# Patient Record
Sex: FEMALE | Race: WHITE | NOT HISPANIC OR LATINO | Employment: STUDENT
[De-identification: names, ages, dates, MRNs, and addresses within clinical notes are randomized per-mention and may not be internally consistent; named-entity substitution may affect disease eponyms.]

---

## 2017-03-24 DIAGNOSIS — F41.1 GENERALIZED ANXIETY DISORDER: ICD-10-CM

## 2017-03-24 RX ORDER — BUSPIRONE HYDROCHLORIDE 10 MG/1
TABLET ORAL
Qty: 60 TABLET | Refills: 0 | Status: SHIPPED | OUTPATIENT
Start: 2017-03-24 | End: 2017-05-04

## 2017-03-24 NOTE — TELEPHONE ENCOUNTER
busPIRone (BUSPAR) 10 MG tablet       Last Written Prescription Date: 6/13/2016  Last Fill Quantity: 180; # refills: 2  Last Office Visit with [facility], [facility] or [facility] prescribing provider:  3/15/2016        Last PHQ-9 score on record=   PHQ-9 SCORE 8/19/2015   Total Score -   Total Score 2       No results found for: AST  No results found for: ALT

## 2017-06-10 ENCOUNTER — HEALTH MAINTENANCE LETTER (OUTPATIENT)
Age: 22
End: 2017-06-10

## 2017-06-27 ENCOUNTER — TELEPHONE (OUTPATIENT)
Dept: INTERNAL MEDICINE | Facility: CLINIC | Age: 22
End: 2017-06-27

## 2017-06-27 NOTE — TELEPHONE ENCOUNTER
Patient has an appointment on 9/1 but would like to get in sooner if possible. Depression is really bad right now and needs to know who to talk to and medication information. She is taking her meds but the depression is worse. I asked if she would like to see another doctor and she said no, she wants to see Jason. If we can't get her in, she may be willing to try someone else but she would like us to try to get her in.  [phone],ok to lv detailed message, anytime.  [facility]

## 2017-09-03 ENCOUNTER — MYC MEDICAL ADVICE (OUTPATIENT)
Dept: INTERNAL MEDICINE | Facility: CLINIC | Age: 22
End: 2017-09-03

## 2019-01-31 ENCOUNTER — OFFICE VISIT (OUTPATIENT)
Dept: INTERNAL MEDICINE | Facility: CLINIC | Age: 24
End: 2019-01-31
Payer: COMMERCIAL

## 2019-01-31 VITALS
DIASTOLIC BLOOD PRESSURE: 62 MMHG | WEIGHT: 186 LBS | HEIGHT: 70 IN | OXYGEN SATURATION: 98 % | TEMPERATURE: 97.5 F | SYSTOLIC BLOOD PRESSURE: 106 MMHG | HEART RATE: 68 BPM | BODY MASS INDEX: 26.63 KG/M2 | RESPIRATION RATE: 16 BRPM

## 2019-01-31 DIAGNOSIS — F41.1 GENERALIZED ANXIETY DISORDER: ICD-10-CM

## 2019-01-31 DIAGNOSIS — F33.41 RECURRENT MAJOR DEPRESSIVE DISORDER, IN PARTIAL REMISSION (H): Primary | ICD-10-CM

## 2019-01-31 PROCEDURE — 99213 OFFICE O/P EST LOW 20 MIN: CPT | Performed by: PHYSICIAN ASSISTANT

## 2019-01-31 RX ORDER — BUSPIRONE HYDROCHLORIDE 10 MG/1
10 TABLET ORAL DAILY
Qty: 90 TABLET | Refills: 3 | Status: SHIPPED | OUTPATIENT
Start: 2019-01-31 | End: 2020-04-24

## 2019-01-31 RX ORDER — NORGESTIMATE AND ETHINYL ESTRADIOL 0.25-0.035
KIT ORAL
Refills: 0 | COMMUNITY
Start: 2018-12-19 | End: 2019-03-14

## 2019-01-31 RX ORDER — SERTRALINE HYDROCHLORIDE 100 MG/1
1 TABLET, FILM COATED ORAL EVERY 24 HOURS
COMMUNITY
Start: 2008-11-04 | End: 2019-01-31

## 2019-01-31 RX ORDER — SERTRALINE HYDROCHLORIDE 100 MG/1
100 TABLET, FILM COATED ORAL EVERY 24 HOURS
Qty: 90 TABLET | Refills: 3 | Status: SHIPPED | OUTPATIENT
Start: 2019-01-31 | End: 2020-03-17

## 2019-01-31 ASSESSMENT — ANXIETY QUESTIONNAIRES
7. FEELING AFRAID AS IF SOMETHING AWFUL MIGHT HAPPEN: NOT AT ALL
6. BECOMING EASILY ANNOYED OR IRRITABLE: SEVERAL DAYS
1. FEELING NERVOUS, ANXIOUS, OR ON EDGE: NOT AT ALL
GAD7 TOTAL SCORE: 1
5. BEING SO RESTLESS THAT IT IS HARD TO SIT STILL: NOT AT ALL
2. NOT BEING ABLE TO STOP OR CONTROL WORRYING: NOT AT ALL
3. WORRYING TOO MUCH ABOUT DIFFERENT THINGS: NOT AT ALL
IF YOU CHECKED OFF ANY PROBLEMS ON THIS QUESTIONNAIRE, HOW DIFFICULT HAVE THESE PROBLEMS MADE IT FOR YOU TO DO YOUR WORK, TAKE CARE OF THINGS AT HOME, OR GET ALONG WITH OTHER PEOPLE: NOT DIFFICULT AT ALL

## 2019-01-31 ASSESSMENT — MIFFLIN-ST. JEOR: SCORE: 1676

## 2019-01-31 ASSESSMENT — PATIENT HEALTH QUESTIONNAIRE - PHQ9
SUM OF ALL RESPONSES TO PHQ QUESTIONS 1-9: 6
5. POOR APPETITE OR OVEREATING: NOT AT ALL

## 2019-01-31 NOTE — PROGRESS NOTES
"  SUBJECTIVE:   Crsitel Irving is a 23 year old female who presents to clinic today for the following health issues:    New Patient/Transfer of Care. Est care       Medication Followup of Zoloft and Buspar     Taking Medication as prescribed: yes    Side Effects:  None    Medication Helping Symptoms:  Yes    Patient reports anxiety is well controlled    Pt notes some increase in depression, but notes this is common for winter     Pt has no side effects or concerns      Problem list and histories reviewed & adjusted, as indicated.  Additional history: as documented      Reviewed and updated as needed this visit by clinical staff  Tobacco  Allergies  Meds  Med Hx  Surg Hx  Fam Hx  Soc Hx      Reviewed and updated as needed this visit by Provider  Tobacco  Surg Hx  Soc Hx          OBJECTIVE:     /62   Pulse 68   Temp 97.5  F (36.4  C) (Oral)   Resp 16   Ht 1.765 m (5' 9.5\")   Wt 84.4 kg (186 lb)   LMP 01/27/2019 (Exact Date)   SpO2 98%   Breastfeeding? No   BMI 27.07 kg/m    Body mass index is 27.07 kg/m .  GENERAL: healthy, alert and no distress  PSYCH: mentation appears normal, affect normal/bright, affect flat and judgement and insight intact    Diagnostic Test Results:  none     ASSESSMENT/PLAN:     1. Generalized anxiety disorder  2. Recurrent major depressive disorder, in partial remission (H)  - reviewed interaction with medications and potential for serotonin syndrome.  pt declines switch in medication  - busPIRone (BUSPAR) 10 MG tablet; Take 1 tablet (10 mg) by mouth daily  Dispense: 90 tablet; Refill: 3  - sertraline (ZOLOFT) 100 MG tablet; Take 1 tablet (100 mg) by mouth every 24 hours  Dispense: 90 tablet; Refill: 3  - annual follow up     Mayra Cuenca PA-C  [facility]  "

## 2019-02-01 ASSESSMENT — ANXIETY QUESTIONNAIRES: GAD7 TOTAL SCORE: 1

## 2019-03-11 ENCOUNTER — TELEPHONE (OUTPATIENT)
Dept: INTERNAL MEDICINE | Facility: CLINIC | Age: 24
End: 2019-03-11

## 2019-03-14 DIAGNOSIS — Z30.011 ENCOUNTER FOR INITIAL PRESCRIPTION OF CONTRACEPTIVE PILLS: Primary | ICD-10-CM

## 2019-03-14 RX ORDER — NORGESTIMATE AND ETHINYL ESTRADIOL 0.25-0.035
KIT ORAL
Qty: 84 TABLET | Refills: 0 | Status: SHIPPED | OUTPATIENT
Start: 2019-03-14 | End: 2020-05-11

## 2019-03-14 NOTE — TELEPHONE ENCOUNTER
Routing refill request to provider for review/approval because:  Medication is reported/historical    Ese WOLFE RN, BSN, PHN

## 2019-03-14 NOTE — TELEPHONE ENCOUNTER
"Requested Prescriptions   Pending Prescriptions Disp Refills     ESTARYLLA 0.25-35 MG-MCG tablet  0    Contraceptives Protocol Passed - 3/14/2019  4:23 PM       Passed - Patient is not a current smoker if age is 35 or older       Passed - Recent (12 mo) or future (30 days) visit within the authorizing provider's specialty    Patient had office visit in the last 12 months or has a visit in the next 30 days with authorizing provider or within the authorizing provider's specialty.  See \"Patient Info\" tab in inbasket, or \"Choose Columns\" in Meds & Orders section of the refill encounter.             Passed - Medication is active on med list       Passed - No active pregnancy on record       Passed - No positive pregnancy test in past 12 months        Last Written Prescription Date:  12/19/18  Last Fill Quantity: ,  # refills: 0   Last office visit: 1/31/2019 with prescribing provider:  1/31/19   Future Office Visit:      "

## 2019-06-05 ENCOUNTER — OFFICE VISIT (OUTPATIENT)
Dept: INTERNAL MEDICINE | Facility: CLINIC | Age: 24
End: 2019-06-05
Payer: COMMERCIAL

## 2019-06-05 VITALS
DIASTOLIC BLOOD PRESSURE: 74 MMHG | HEART RATE: 77 BPM | WEIGHT: 183.4 LBS | TEMPERATURE: 98.7 F | SYSTOLIC BLOOD PRESSURE: 102 MMHG | BODY MASS INDEX: 26.7 KG/M2 | OXYGEN SATURATION: 98 % | RESPIRATION RATE: 16 BRPM

## 2019-06-05 DIAGNOSIS — Z30.41 ENCOUNTER FOR SURVEILLANCE OF CONTRACEPTIVE PILLS: ICD-10-CM

## 2019-06-05 PROCEDURE — 99213 OFFICE O/P EST LOW 20 MIN: CPT | Performed by: PHYSICIAN ASSISTANT

## 2019-06-05 RX ORDER — NORGESTIMATE AND ETHINYL ESTRADIOL 7DAYSX3 LO
1 KIT ORAL DAILY
Qty: 84 TABLET | Refills: 3 | Status: SHIPPED | OUTPATIENT
Start: 2019-06-05 | End: 2020-05-11

## 2019-06-09 ENCOUNTER — MYC MEDICAL ADVICE (OUTPATIENT)
Dept: INTERNAL MEDICINE | Facility: CLINIC | Age: 24
End: 2019-06-09

## 2019-06-09 DIAGNOSIS — Z30.011 ENCOUNTER FOR INITIAL PRESCRIPTION OF CONTRACEPTIVE PILLS: ICD-10-CM

## 2019-06-10 RX ORDER — NORGESTIMATE AND ETHINYL ESTRADIOL 0.25-0.035
1 KIT ORAL DAILY
Qty: 84 TABLET | Refills: 3 | Status: SHIPPED | OUTPATIENT
Start: 2019-06-10 | End: 2020-05-11

## 2019-06-10 RX ORDER — NORGESTIMATE AND ETHINYL ESTRADIOL 7DAYSX3 28
1 KIT ORAL DAILY
Qty: 84 TABLET | Refills: 3 | Status: SHIPPED | OUTPATIENT
Start: 2019-06-10 | End: 2020-05-11

## 2019-06-10 RX ORDER — NORGESTIMATE AND ETHINYL ESTRADIOL 0.25-0.035
1 KIT ORAL DAILY
Status: CANCELLED | OUTPATIENT
Start: 2019-06-10

## 2020-03-16 DIAGNOSIS — F41.1 GENERALIZED ANXIETY DISORDER: ICD-10-CM

## 2020-03-17 RX ORDER — SERTRALINE HYDROCHLORIDE 100 MG/1
100 TABLET, FILM COATED ORAL EVERY 24 HOURS
Qty: 90 TABLET | Refills: 1 | Status: SHIPPED | OUTPATIENT
Start: 2020-03-17 | End: 2020-05-11

## 2020-04-24 ENCOUNTER — MYC REFILL (OUTPATIENT)
Dept: INTERNAL MEDICINE | Facility: CLINIC | Age: 25
End: 2020-04-24

## 2020-04-24 DIAGNOSIS — F41.1 GENERALIZED ANXIETY DISORDER: ICD-10-CM

## 2020-04-24 RX ORDER — BUSPIRONE HYDROCHLORIDE 10 MG/1
10 TABLET ORAL DAILY
Qty: 30 TABLET | Refills: 0 | Status: SHIPPED | OUTPATIENT
Start: 2020-04-24 | End: 2020-05-11

## 2020-04-24 NOTE — TELEPHONE ENCOUNTER
Prescription approved per [facility] Refill Protocol.  Due for follow-up appointment.

## 2020-05-11 ENCOUNTER — VIRTUAL VISIT (OUTPATIENT)
Dept: INTERNAL MEDICINE | Facility: CLINIC | Age: 25
End: 2020-05-11
Payer: COMMERCIAL

## 2020-05-11 DIAGNOSIS — Z30.41 ENCOUNTER FOR BIRTH CONTROL PILLS MAINTENANCE: ICD-10-CM

## 2020-05-11 DIAGNOSIS — F33.42 RECURRENT MAJOR DEPRESSIVE DISORDER, IN FULL REMISSION (H): ICD-10-CM

## 2020-05-11 DIAGNOSIS — F41.1 GENERALIZED ANXIETY DISORDER: Primary | ICD-10-CM

## 2020-05-11 PROCEDURE — 99213 OFFICE O/P EST LOW 20 MIN: CPT | Mod: 95 | Performed by: INTERNAL MEDICINE

## 2020-05-11 RX ORDER — SERTRALINE HYDROCHLORIDE 100 MG/1
100 TABLET, FILM COATED ORAL EVERY 24 HOURS
Qty: 90 TABLET | Refills: 3 | Status: SHIPPED | OUTPATIENT
Start: 2020-05-11 | End: 2021-05-21

## 2020-05-11 RX ORDER — NORGESTIMATE AND ETHINYL ESTRADIOL 0.25-0.035
1 KIT ORAL DAILY
Qty: 84 TABLET | Refills: 3 | Status: SHIPPED | OUTPATIENT
Start: 2020-05-11 | End: 2020-08-20

## 2020-05-11 RX ORDER — BUSPIRONE HYDROCHLORIDE 10 MG/1
10 TABLET ORAL DAILY
Qty: 90 TABLET | Refills: 3 | Status: SHIPPED | OUTPATIENT
Start: 2020-05-11 | End: 2020-08-20

## 2020-05-11 ASSESSMENT — ANXIETY QUESTIONNAIRES
6. BECOMING EASILY ANNOYED OR IRRITABLE: NOT AT ALL
IF YOU CHECKED OFF ANY PROBLEMS ON THIS QUESTIONNAIRE, HOW DIFFICULT HAVE THESE PROBLEMS MADE IT FOR YOU TO DO YOUR WORK, TAKE CARE OF THINGS AT HOME, OR GET ALONG WITH OTHER PEOPLE: NOT DIFFICULT AT ALL
7. FEELING AFRAID AS IF SOMETHING AWFUL MIGHT HAPPEN: NOT AT ALL
2. NOT BEING ABLE TO STOP OR CONTROL WORRYING: NOT AT ALL
1. FEELING NERVOUS, ANXIOUS, OR ON EDGE: NOT AT ALL
GAD7 TOTAL SCORE: 0
5. BEING SO RESTLESS THAT IT IS HARD TO SIT STILL: NOT AT ALL
3. WORRYING TOO MUCH ABOUT DIFFERENT THINGS: NOT AT ALL

## 2020-05-11 ASSESSMENT — PATIENT HEALTH QUESTIONNAIRE - PHQ9
5. POOR APPETITE OR OVEREATING: NOT AT ALL
SUM OF ALL RESPONSES TO PHQ QUESTIONS 1-9: 0

## 2020-05-11 NOTE — PROGRESS NOTES
"Cristel Irving is a 25 year old female who is being evaluated via a billable telephone visit.      The patient has been notified of following:     \"This telephone visit will be conducted via a call between you and your physician/provider. We have found that certain health care needs can be provided without the need for a physical exam.  This service lets us provide the care you need with a short phone conversation.  If a prescription is necessary we can send it directly to your pharmacy.  If lab work is needed we can place an order for that and you can then stop by our lab to have the test done at a later time.    Telephone visits are billed at different rates depending on your insurance coverage. During this emergency period, for some insurers they may be billed the same as an in-person visit.  Please reach out to your insurance provider with any questions.    If during the course of the call the physician/provider feels a telephone visit is not appropriate, you will not be charged for this service.\"    Patient has given verbal consent for Telephone visit?  Yes    What phone number would you like to be contacted at? [phone]    How would you like to obtain your AVS? MyChart    TELEPHONE VISIT                                                      SUBJECTIVE:                                                      HPI: Cristel Irving is a pleasant 25 year old female who requested a telephone visit to discuss her current medications:    Patient is currently on Zoloft 100 mg daily and BuSpar 10 mg daily for depression and anxiety. Tolerating well - no adverse side effects. Patient reports that her depression and anxiety are very well controlled on current medications. No adjustments desired. Refills needed.    Patient is also on Ortho-Cyclen for birth control. Tolerating well - no adverse side effects. She continues to not smoke. She understands that birth control pills do not protect against STDs. Refills " needed.    ASSESSMENT/PLAN:                                                      (F41.1) Generalized anxiety disorder  (primary encounter diagnosis)  (F33.42) Recurrent major depressive disorder, in full remission (H)  Comment: well-controlled on Zoloft and Buspar.   Plan: CPM; refills provided.    (Z30.41) Encounter for birth control pills maintenance  Plan: refills of OCP provided.     Total time of call between patient and provider was 5 minutes.     (Chart documentation was completed, in part, with Pro V[employer]V voice-recognition software. Even though reviewed, some grammatical, spelling, and word errors may remain.)    Rosa Goodman MD   [address] [address]  T: [phone], F: [phone]

## 2020-05-12 ASSESSMENT — ANXIETY QUESTIONNAIRES: GAD7 TOTAL SCORE: 0

## 2020-08-18 DIAGNOSIS — Z30.41 ENCOUNTER FOR BIRTH CONTROL PILLS MAINTENANCE: ICD-10-CM

## 2020-08-18 DIAGNOSIS — F41.1 GENERALIZED ANXIETY DISORDER: ICD-10-CM

## 2020-08-20 RX ORDER — NORGESTIMATE AND ETHINYL ESTRADIOL 0.25-0.035
1 KIT ORAL DAILY
Qty: 84 TABLET | Refills: 2 | Status: SHIPPED | OUTPATIENT
Start: 2020-08-20 | End: 2021-07-07

## 2020-08-20 RX ORDER — BUSPIRONE HYDROCHLORIDE 10 MG/1
10 TABLET ORAL DAILY
Qty: 90 TABLET | Refills: 2 | Status: SHIPPED | OUTPATIENT
Start: 2020-08-20 | End: 2021-06-22

## 2020-08-28 ENCOUNTER — E-VISIT (OUTPATIENT)
Dept: INTERNAL MEDICINE | Facility: CLINIC | Age: 25
End: 2020-08-28
Payer: COMMERCIAL

## 2020-08-28 ENCOUNTER — MYC MEDICAL ADVICE (OUTPATIENT)
Dept: INTERNAL MEDICINE | Facility: CLINIC | Age: 25
End: 2020-08-28

## 2020-08-28 DIAGNOSIS — Z30.41 ENCOUNTER FOR BIRTH CONTROL PILLS MAINTENANCE: Primary | ICD-10-CM

## 2020-08-28 PROCEDURE — 99421 OL DIG E/M SVC 5-10 MIN: CPT | Performed by: INTERNAL MEDICINE

## 2020-08-28 RX ORDER — NORGESTIMATE AND ETHINYL ESTRADIOL 7DAYSX3 LO
1 KIT ORAL DAILY
Qty: 84 TABLET | Refills: 3 | Status: SHIPPED | OUTPATIENT
Start: 2020-08-28 | End: 2021-07-07

## 2020-12-21 ENCOUNTER — DOCUMENTATION ONLY (OUTPATIENT)
Dept: INTERNAL MEDICINE | Facility: CLINIC | Age: 25
End: 2020-12-21

## 2020-12-22 ENCOUNTER — APPOINTMENT (OUTPATIENT)
Dept: LAB | Facility: CLINIC | Age: 25
End: 2020-12-22
Payer: COMMERCIAL

## 2021-01-15 ENCOUNTER — HEALTH MAINTENANCE LETTER (OUTPATIENT)
Age: 26
End: 2021-01-15

## 2021-05-21 DIAGNOSIS — F41.1 GENERALIZED ANXIETY DISORDER: ICD-10-CM

## 2021-05-21 RX ORDER — SERTRALINE HYDROCHLORIDE 100 MG/1
TABLET, FILM COATED ORAL
Qty: 90 TABLET | Refills: 0 | Status: SHIPPED | OUTPATIENT
Start: 2021-05-21 | End: 2021-07-07

## 2021-06-22 DIAGNOSIS — F41.1 GENERALIZED ANXIETY DISORDER: ICD-10-CM

## 2021-06-22 RX ORDER — BUSPIRONE HYDROCHLORIDE 10 MG/1
10 TABLET ORAL DAILY
Qty: 90 TABLET | Refills: 0 | Status: SHIPPED | OUTPATIENT
Start: 2021-06-22 | End: 2021-07-07

## 2021-06-22 NOTE — TELEPHONE ENCOUNTER
Prescription approved per [facility] Refill Protocol.for 3 months.    Care Team:    Please call patient to schedule appointment to establish care with new PCP before additional refills.    Shawanda Cardenas, Triage RN  [facility]

## 2021-07-07 ENCOUNTER — VIRTUAL VISIT (OUTPATIENT)
Dept: INTERNAL MEDICINE | Facility: CLINIC | Age: 26
End: 2021-07-07
Payer: COMMERCIAL

## 2021-07-07 DIAGNOSIS — F33.42 RECURRENT MAJOR DEPRESSIVE DISORDER, IN FULL REMISSION (H): ICD-10-CM

## 2021-07-07 DIAGNOSIS — L70.0 ACNE VULGARIS: ICD-10-CM

## 2021-07-07 DIAGNOSIS — Z30.41 ENCOUNTER FOR BIRTH CONTROL PILLS MAINTENANCE: ICD-10-CM

## 2021-07-07 DIAGNOSIS — F41.1 GENERALIZED ANXIETY DISORDER: Primary | ICD-10-CM

## 2021-07-07 PROCEDURE — 99213 OFFICE O/P EST LOW 20 MIN: CPT | Mod: 95 | Performed by: INTERNAL MEDICINE

## 2021-07-07 RX ORDER — SERTRALINE HYDROCHLORIDE 100 MG/1
100 TABLET, FILM COATED ORAL DAILY
Qty: 90 TABLET | Refills: 3 | Status: SHIPPED | OUTPATIENT
Start: 2021-07-07 | End: 2022-08-01

## 2021-07-07 RX ORDER — BUSPIRONE HYDROCHLORIDE 10 MG/1
10 TABLET ORAL DAILY
Qty: 90 TABLET | Refills: 3 | Status: SHIPPED | OUTPATIENT
Start: 2021-07-07 | End: 2022-08-01

## 2021-07-07 RX ORDER — NORGESTIMATE AND ETHINYL ESTRADIOL 7DAYSX3 LO
1 KIT ORAL DAILY
Qty: 84 TABLET | Refills: 3 | Status: SHIPPED | OUTPATIENT
Start: 2021-07-07 | End: 2022-07-28

## 2021-07-07 NOTE — PROGRESS NOTES
VIDEO VISIT                                                       ASSESSMENT/PLAN                                                      (F41.1) Generalized anxiety disorder  (primary encounter diagnosis)  (F33.42) Recurrent major depressive disorder, in full remission (H)  Comment: well-controlled on current regimen.    Plan: continue present management; refills provided.     (L70.0) Acne vulgaris  (Z30.41) Encounter for birth control pills maintenance  Comment: well-controlled on current regimen.    Plan: continue present management; refills provided.     Total time of video call between patient and provider was 2 minutes (11:11-11:13am). Provider location: office. Patient location: home. Platform: HandUp PBC.     Rosa Goodman MD   [facility]  [address] [place] [id number]  T: [phone], F: [phone]    SUBJECTIVE                                                      Cristel Irving is a very pleasant 26 year old female who requested a video visit to obtain medication refills:    Patient was made aware that this visit will be billed the same as an in-person visit and has given verbal consent to proceed with this video visit.     Patient's anxiety and depression are well controlled on BuSpar 10 mg daily and Zoloft 100 mg daily. Tolerating medication(s) well - no adverse side effects.     Patient is currently on Ortho Tri-Cyclen Lo for birth control and acne. Tolerating medication(s) well - no adverse side effects.      No other concerns or questions today.    OBJECTIVE                                                       Vitals: No vitals were obtained today due to virtual visit.    General: appears healthy, alert and no distress  Psychiatric: mentation normal, affect normal/bright, judgement and insight intact, normal speech and appearance well-groomed    ---    (Note was completed, in part, with [employer] voice-recognition software. Documentation reviewed, but some grammatical,  spelling, and word errors may remain.)

## 2021-09-16 ENCOUNTER — LAB REQUISITION (OUTPATIENT)
Dept: LAB | Facility: CLINIC | Age: 26
End: 2021-09-16
Payer: COMMERCIAL

## 2021-09-16 DIAGNOSIS — Z79.899 OTHER LONG TERM (CURRENT) DRUG THERAPY: ICD-10-CM

## 2021-09-16 DIAGNOSIS — L70.0 ACNE VULGARIS: ICD-10-CM

## 2021-09-16 LAB
ALT SERPL W P-5'-P-CCNC: 15 U/L (ref 0–50)
AST SERPL W P-5'-P-CCNC: 9 U/L (ref 0–45)
CHOLEST SERPL-MCNC: 195 MG/DL
FASTING STATUS PATIENT QL REPORTED: NORMAL
HDLC SERPL-MCNC: 84 MG/DL
LDLC SERPL CALC-MCNC: 91 MG/DL
NONHDLC SERPL-MCNC: 111 MG/DL
TRIGL SERPL-MCNC: 102 MG/DL

## 2021-09-16 PROCEDURE — 36415 COLL VENOUS BLD VENIPUNCTURE: CPT | Mod: ORL | Performed by: DERMATOLOGY

## 2021-09-16 PROCEDURE — 84450 TRANSFERASE (AST) (SGOT): CPT | Mod: ORL | Performed by: DERMATOLOGY

## 2021-09-16 PROCEDURE — 84460 ALANINE AMINO (ALT) (SGPT): CPT | Mod: ORL | Performed by: DERMATOLOGY

## 2021-09-16 PROCEDURE — 80061 LIPID PANEL: CPT | Mod: ORL | Performed by: DERMATOLOGY

## 2021-10-24 ENCOUNTER — HEALTH MAINTENANCE LETTER (OUTPATIENT)
Age: 26
End: 2021-10-24

## 2022-01-06 ENCOUNTER — LAB REQUISITION (OUTPATIENT)
Dept: LAB | Facility: CLINIC | Age: 27
End: 2022-01-06

## 2022-01-06 PROCEDURE — 86481 TB AG RESPONSE T-CELL SUSP: CPT | Performed by: INTERNAL MEDICINE

## 2022-01-09 LAB
QUANTIFERON MITOGEN: 10 IU/ML
QUANTIFERON NIL TUBE: 0.31 IU/ML
QUANTIFERON TB1 TUBE: 0.51 IU/ML
QUANTIFERON TB2 TUBE: 0.53

## 2022-01-10 LAB
GAMMA INTERFERON BACKGROUND BLD IA-ACNC: 0.31 IU/ML
M TB IFN-G BLD-IMP: NEGATIVE
M TB IFN-G CD4+ BCKGRND COR BLD-ACNC: 9.69 IU/ML
MITOGEN IGNF BCKGRD COR BLD-ACNC: 0.2 IU/ML
MITOGEN IGNF BCKGRD COR BLD-ACNC: 0.22 IU/ML

## 2022-02-13 ENCOUNTER — HEALTH MAINTENANCE LETTER (OUTPATIENT)
Age: 27
End: 2022-02-13

## 2022-03-29 PROBLEM — F41.1 GAD (GENERALIZED ANXIETY DISORDER): Status: ACTIVE | Noted: 2022-03-29

## 2022-03-29 PROBLEM — F32.9 MDD (MAJOR DEPRESSIVE DISORDER): Status: ACTIVE | Noted: 2022-03-29

## 2022-03-30 ENCOUNTER — OFFICE VISIT (OUTPATIENT)
Dept: INTERNAL MEDICINE | Facility: CLINIC | Age: 27
End: 2022-03-30
Payer: COMMERCIAL

## 2022-03-30 VITALS
BODY MASS INDEX: 26.34 KG/M2 | RESPIRATION RATE: 16 BRPM | OXYGEN SATURATION: 97 % | DIASTOLIC BLOOD PRESSURE: 66 MMHG | TEMPERATURE: 97.6 F | HEART RATE: 94 BPM | WEIGHT: 184 LBS | SYSTOLIC BLOOD PRESSURE: 102 MMHG | HEIGHT: 70 IN

## 2022-03-30 DIAGNOSIS — M95.0 NOSE DEFORMITY: ICD-10-CM

## 2022-03-30 DIAGNOSIS — Z01.818 PREOPERATIVE EXAMINATION: Primary | ICD-10-CM

## 2022-03-30 PROCEDURE — 99214 OFFICE O/P EST MOD 30 MIN: CPT | Performed by: INTERNAL MEDICINE

## 2022-07-13 ENCOUNTER — E-VISIT (OUTPATIENT)
Dept: INTERNAL MEDICINE | Facility: CLINIC | Age: 27
End: 2022-07-13
Payer: COMMERCIAL

## 2022-07-13 DIAGNOSIS — Z30.011 BCP (BIRTH CONTROL PILLS) INITIATION: Primary | ICD-10-CM

## 2022-07-13 PROCEDURE — 99421 OL DIG E/M SVC 5-10 MIN: CPT | Performed by: INTERNAL MEDICINE

## 2022-07-13 RX ORDER — LEVONORGESTREL AND ETHINYL ESTRADIOL 90; 20 UG/1; UG/1
1 TABLET ORAL DAILY
Qty: 112 TABLET | Refills: 3 | Status: SHIPPED | OUTPATIENT
Start: 2022-07-13 | End: 2023-10-12

## 2022-07-13 RX ORDER — LEVONORGESTREL AND ETHINYL ESTRADIOL 90; 20 UG/1; UG/1
1 TABLET ORAL DAILY
Qty: 90 TABLET | Refills: 3 | Status: SHIPPED | OUTPATIENT
Start: 2022-07-13 | End: 2022-07-13

## 2022-07-26 DIAGNOSIS — F41.1 GENERALIZED ANXIETY DISORDER: ICD-10-CM

## 2022-07-26 DIAGNOSIS — F33.42 RECURRENT MAJOR DEPRESSIVE DISORDER, IN FULL REMISSION (H): ICD-10-CM

## 2022-07-28 ENCOUNTER — LAB (OUTPATIENT)
Dept: LAB | Facility: CLINIC | Age: 27
End: 2022-07-28

## 2022-07-28 ENCOUNTER — OFFICE VISIT (OUTPATIENT)
Dept: ALLERGY | Facility: CLINIC | Age: 27
End: 2022-07-28
Payer: COMMERCIAL

## 2022-07-28 VITALS
OXYGEN SATURATION: 98 % | BODY MASS INDEX: 25.82 KG/M2 | SYSTOLIC BLOOD PRESSURE: 109 MMHG | WEIGHT: 177.4 LBS | DIASTOLIC BLOOD PRESSURE: 73 MMHG | HEART RATE: 68 BPM

## 2022-07-28 DIAGNOSIS — J30.81 ALLERGIC RHINITIS DUE TO ANIMAL (CAT) (DOG) HAIR AND DANDER: ICD-10-CM

## 2022-07-28 DIAGNOSIS — J30.81 ALLERGIC RHINITIS DUE TO ANIMAL (CAT) (DOG) HAIR AND DANDER: Primary | ICD-10-CM

## 2022-07-28 LAB
Lab: NORMAL
Lab: NORMAL
PERFORMING LABORATORY: NORMAL
PERFORMING LABORATORY: NORMAL
SPECIMEN STATUS: NORMAL
SPECIMEN STATUS: NORMAL
TEST NAME: NORMAL
TEST NAME: NORMAL

## 2022-07-28 PROCEDURE — 86003 ALLG SPEC IGE CRUDE XTRC EA: CPT

## 2022-07-28 PROCEDURE — 95004 PERQ TESTS W/ALRGNC XTRCS: CPT | Performed by: INTERNAL MEDICINE

## 2022-07-28 PROCEDURE — 86003 ALLG SPEC IGE CRUDE XTRC EA: CPT | Mod: 59

## 2022-07-28 PROCEDURE — 99203 OFFICE O/P NEW LOW 30 MIN: CPT | Mod: 25 | Performed by: INTERNAL MEDICINE

## 2022-07-28 PROCEDURE — 36415 COLL VENOUS BLD VENIPUNCTURE: CPT

## 2022-07-28 RX ORDER — EPINEPHRINE 0.3 MG/.3ML
0.3 INJECTION SUBCUTANEOUS PRN
Qty: 2 EACH | Refills: 2 | Status: SHIPPED | OUTPATIENT
Start: 2022-07-28 | End: 2023-05-05

## 2022-07-28 ASSESSMENT — ENCOUNTER SYMPTOMS
COUGH: 0
VOMITING: 0
RHINORRHEA: 1
MYALGIAS: 0
JOINT SWELLING: 0
SINUS PRESSURE: 0
FACIAL SWELLING: 0
DIARRHEA: 0
ADENOPATHY: 0
ACTIVITY CHANGE: 0
CHILLS: 0
EYE ITCHING: 0
NAUSEA: 0
WHEEZING: 0
HEADACHES: 0
ARTHRALGIAS: 0
FEVER: 0
SHORTNESS OF BREATH: 0
CHEST TIGHTNESS: 0
EYE DISCHARGE: 0
EYE REDNESS: 0

## 2022-07-28 NOTE — PROGRESS NOTES
Cristel Irving was seen in the Allergy Clinic at [facility].    Cristel Irving is a 27 year old White female being seen today in consultation for allergies shots.    She has a history of allergic rhinitis and did do allergy shots in the past.  She has a history of a history of asthma.  She was on allergy shots for less than 1 year as the allergist at that time told her that she could not have a cat in the home and continue with allergy shots.  She has not tried any medications such as antihistamines or nasal sprays.  Her cat recently passed away and she would like to get another cat and is trying to come up with options to reduce her symptoms with animals.  She does have symptoms around dogs also.  She is considering options other optional animal such as gerbils or hamsters.  Some day she may want a goat.    Her symptoms include nasal congestion, sneezing, facial pressure and eye itching.  While in college away from her at her symptoms were much better controlled      Past Medical History:   Diagnosis Date     JESSIE (generalized anxiety disorder)      MDD (major depressive disorder)      Family History   Problem Relation Age of Onset     Anxiety Disorder Mother      Depression Father      Depression Paternal Grandmother      Diabetes No family hx of      Cerebrovascular Disease No family hx of      Coronary Artery Disease Early Onset No family hx of      Breast Cancer No family hx of      Ovarian Cancer No family hx of      Colon Cancer No family hx of      Past Surgical History:   Procedure Laterality Date     NO HISTORY OF SURGERY       Patient supplied answers from flow sheet for:  Allergy and Asthma Intake Questionnaire.  Allergy and Asthma Questionnaire Social History  Family Doctor: : (P) Rosa Goodman  Did He or She suggest you see us?: (P) No  Clinic:: (P) [facility]  Check the reasons for your appointment: (P) Nasal or Sinus Symptoms, Eye Symptoms, Hives  Tell us about  your home: (P) House, Cats, Feather pillow, Bedroom Carpet, [place], Forced air heat, Air conditioning, Damp Basement  Job:: (P) Student    Allergy Testing  Have you been Tested for Allergies? : (P) Yes  If yes, when?: (P) 2008 and 2012  At what clinic?: (P) [facility] and [facility]    Nasal and Eye Symptoms   Check any nasal or sinus symptoms you have: : (P) Runny Nose, Itching, Sneezing, Stuffy head or nose  Have you had Nasal polyps?: (P) No  Have you had sinus surgery?: (P) Yes  Date of sinus surgery? : (P) 4/21/2022  Have you had sinus infections?: (P) No  Have you used any nasal sprays?: (P) Yes  Names:: (P) Unsure  Have you used any pills for symptoms?: (P) Yes  Names:: (P) Claritin, Allegra, Benadryl, Zyrtec  Have you used any eye drops: (P) Yes  Names:: (P) Alaway  When do nasal, sinus or eye symptoms occur?: (P) Indoors  What makes nasal, sinus or eye symptoms worse?: (P) Cats, Dogs         Hives   Are you under more stress than usual?: (P) Yes  Have you recently taken new medicines, vitamins or minerals?: (P) Yes  Has the dose of your medicines recently changed?: (P) No  Have you had a recent infection?: (P) No  Do you have contact with latex?: (P) No  Have you ever had hepatitis? : (P) No  Have you or a family member had low or high thyroid?: (P) No  Have you or a family member had lupus or rheumatoid arthritis? : (P) No  Do any family members have hives or swelling episodes? : (P) No                ENVIRONMENTAL HISTORY:   Pets inside the house include none.  Do you smoke cigarettes or other recreational drugs? No There is/are 0 smokers living in the house. The house does have a damp basement.     SOCIAL HISTORY:   Cristel is student. She lives with her mother.  Her mother is retired  Review of Systems   Constitutional: Negative for activity change, chills and fever.   HENT: Positive for rhinorrhea and sneezing. Negative for congestion, dental  problem, ear pain, facial swelling, nosebleeds, postnasal drip and sinus pressure.    Eyes: Negative for discharge, redness and itching.   Respiratory: Negative for cough, chest tightness, shortness of breath and wheezing.    Cardiovascular: Negative for chest pain.   Gastrointestinal: Negative for diarrhea, nausea and vomiting.   Musculoskeletal: Negative for arthralgias, joint swelling and myalgias.   Skin: Negative for rash.   Neurological: Negative for headaches.   Hematological: Negative for adenopathy.   Psychiatric/Behavioral: Negative for behavioral problems and self-injury.         Current Outpatient Medications:      busPIRone (BUSPAR) 10 MG tablet, Take 1 tablet (10 mg) by mouth daily, Disp: 90 tablet, Rfl: 3     levonorgestrel-ethinyl estrad (TORI) 90-20 MCG tablet, Take 1 tablet by mouth daily, Disp: 112 tablet, Rfl: 3     sertraline (ZOLOFT) 100 MG tablet, Take 1 tablet (100 mg) by mouth daily, Disp: 90 tablet, Rfl: 3  No Known Allergies      EXAM:   /73   Pulse 68   Wt 80.5 kg (177 lb 6.4 oz)   SpO2 98%   BMI 25.82 kg/m      Physical Exam   Constitutional:       General: She is not in acute distress.     Appearance: Normal appearance. She is not ill-appearing.   HENT:      Head: Normocephalic and atraumatic.      Nose: She has 2+ bilateral inferior turbinate hypertrophy with nasal mucus bilaterally     Mouth/Throat:      Mouth: Mucous membranes are moist.      Pharynx: Oropharynx is clear. No posterior oropharyngeal erythema.   Eyes:      General:         Right eye: No discharge.         Left eye: No discharge.   Cardiovascular:      Rate and Rhythm: Normal rate and regular rhythm.      Heart sounds: Normal heart sounds.   Pulmonary:      Effort: Pulmonary effort is normal.      Breath sounds: Normal breath sounds. No wheezing or rhonchi.   Skin:     General: Skin is warm.      Findings: No erythema or rash.   Neurological:      General: No focal deficit present.      Mental Status: She  is alert. Mental status is at baseline.   Psychiatric:         Mood and Affect: Mood normal.         Behavior: Behavior normal.       WORKUP: Skin testing was positive to dog and cat and negative to other animals    ENVIRONMENTAL PERCUTANEOUS SKIN TESTING: ADULT  [facility] 7/28/2022   Consent N   Ordering Physician Dr. Crews   Interpreting Physician Dr. Crews   Testing Technician Tyree SENIOR MA   Location Back   Time start: 10:40 AM   Time End: 10:55 AM   Positive Control: Histatrol*ALK 1 mg/ml 0   Negative Control: 50% Glycerin 0   Cat Hair*ALK (10,000 BAU/ml) 5/8   AP Dog Hair/Dander (1:100 w/v) 5/12   Guinea Pig (1:10w/v) 0   Hamster ** Esquivel (W/F in millimeters) 0   Rabbit* ALK (W/F in millimeters) 0   Cow* ALK (W/F in millimeters) 0   Gerbil** Esquivel (W/F in millimeters) 0   Mouse* ALK (W/F in millimeters) 0        ASSESSMENT/PLAN:  Cristel Irving is a 27 year old female seen today for allergic rhinitis.  Having symptoms primarily around animals.  She like to get another cat.  She is happy to know that she can be on allergy shots and had a cat in the home at the same time.  She was not interested in doing any allergy testing to pollens or additional allergens.    1.  Recommended Zyrtec or Allegra as well as Flonase to help control her symptoms when around animals.  2.  She also wants to start allergy shots and she is well aware of the risks and benefits.  We reviewed the allergy immunotherapy handout and she signed the consent form.  She we will plan to have an EpiPen.  3. The patient was counseled regarding the time commitment, billing responsibility depending on the insurance coverage (also the insurance will be billed once allergen immunotherapy serums are compounded), auto-injectable epinephrine device policy, risks (I stated risks include hives, swelling, shortness of breath.) She is aware of the benefits of allergen immunotherapy and she wishes to proceed.  We went over the informed consent  that needed to be signed, agreeing to remain in the clinic for 30 minutes after the injection.      Follow-up in 3 months.      Thank you for allowing me to participate in the care of Cristel Irving.      A total of 40 minutes was spent on the day of the encounter performing chart review, history and exam, documentation, and counseling and coordination of care as noted above.       Cezar Crews MD  Allergy/Immunology  [facility]

## 2022-07-28 NOTE — PROGRESS NOTES
Per provider verbal order, placed Cat hair, Dog hair, Guinea pig, Hamster, Rabbit, Cow, Gerbil and Mouse  test.    Once panels were placed, patient was monitored for 15 minutes in clinic.  Provider read test after 15 minutes..  Pt tolerated procedure well.  All questions and concerns were addressed at office visit.         Tyree Bryant MA

## 2022-07-28 NOTE — LETTER
7/28/2022         RE: Cristel Irving  [address]  [facility] [address]        Dear Colleague,    Thank you for referring your patient, Cristel Irving, to the [facility] [place]. Please see a copy of my visit note below.    Cristel Irving was seen in the Allergy Clinic at [facility].    Cristel Irving is a 27 year old White female being seen today in consultation for allergies shots.    She has a history of allergic rhinitis and did do allergy shots in the past.  She has a history of a history of asthma.  She was on allergy shots for less than 1 year as the allergist at that time told her that she could not have a cat in the home and continue with allergy shots.  She has not tried any medications such as antihistamines or nasal sprays.  Her cat recently passed away and she would like to get another cat and is trying to come up with options to reduce her symptoms with animals.  She does have symptoms around dogs also.  She is considering options other optional animal such as gerbils or hamsters.  Some day she may want a goat.    Her symptoms include nasal congestion, sneezing, facial pressure and eye itching.  While in college away from her at her symptoms were much better controlled      Past Medical History:   Diagnosis Date     JESSIE (generalized anxiety disorder)      MDD (major depressive disorder)      Family History   Problem Relation Age of Onset     Anxiety Disorder Mother      Depression Father      Depression Paternal Grandmother      Diabetes No family hx of      Cerebrovascular Disease No family hx of      Coronary Artery Disease Early Onset No family hx of      Breast Cancer No family hx of      Ovarian Cancer No family hx of      Colon Cancer No family hx of      Past Surgical History:   Procedure Laterality Date     NO HISTORY OF SURGERY       Patient supplied answers from flow sheet for:  Allergy and Asthma Intake  Questionnaire.  Allergy and Asthma Questionnaire Social History  Family Doctor: : (P) Rosa Goodman  Did He or She suggest you see us?: (P) No  Clinic:: (P) [facility]  Check the reasons for your appointment: (P) Nasal or Sinus Symptoms, Eye Symptoms, Hives  Tell us about your home: (P) House, Cats, Feather pillow, Bedroom Carpet, [place], Forced air heat, Air conditioning, Damp Basement  Job:: (P) Student    Allergy Testing  Have you been Tested for Allergies? : (P) Yes  If yes, when?: (P) 2008 and 2012  At what clinic?: (P) [facility] and [facility]    Nasal and Eye Symptoms   Check any nasal or sinus symptoms you have: : (P) Runny Nose, Itching, Sneezing, Stuffy head or nose  Have you had Nasal polyps?: (P) No  Have you had sinus surgery?: (P) Yes  Date of sinus surgery? : (P) 4/21/2022  Have you had sinus infections?: (P) No  Have you used any nasal sprays?: (P) Yes  Names:: (P) Unsure  Have you used any pills for symptoms?: (P) Yes  Names:: (P) Claritin, Allegra, Benadryl, Zyrtec  Have you used any eye drops: (P) Yes  Names:: (P) Alaway  When do nasal, sinus or eye symptoms occur?: (P) Indoors  What makes nasal, sinus or eye symptoms worse?: (P) Cats, Dogs         Hives   Are you under more stress than usual?: (P) Yes  Have you recently taken new medicines, vitamins or minerals?: (P) Yes  Has the dose of your medicines recently changed?: (P) No  Have you had a recent infection?: (P) No  Do you have contact with latex?: (P) No  Have you ever had hepatitis? : (P) No  Have you or a family member had low or high thyroid?: (P) No  Have you or a family member had lupus or rheumatoid arthritis? : (P) No  Do any family members have hives or swelling episodes? : (P) No                ENVIRONMENTAL HISTORY:   Pets inside the house include none.  Do you smoke cigarettes or other recreational drugs? No There is/are 0 smokers living in the house. The house does  have a damp basement.     SOCIAL HISTORY:   Cristel is student. She lives with her mother.  Her mother is retired  Review of Systems   Constitutional: Negative for activity change, chills and fever.   HENT: Positive for rhinorrhea and sneezing. Negative for congestion, dental problem, ear pain, facial swelling, nosebleeds, postnasal drip and sinus pressure.    Eyes: Negative for discharge, redness and itching.   Respiratory: Negative for cough, chest tightness, shortness of breath and wheezing.    Cardiovascular: Negative for chest pain.   Gastrointestinal: Negative for diarrhea, nausea and vomiting.   Musculoskeletal: Negative for arthralgias, joint swelling and myalgias.   Skin: Negative for rash.   Neurological: Negative for headaches.   Hematological: Negative for adenopathy.   Psychiatric/Behavioral: Negative for behavioral problems and self-injury.         Current Outpatient Medications:      busPIRone (BUSPAR) 10 MG tablet, Take 1 tablet (10 mg) by mouth daily, Disp: 90 tablet, Rfl: 3     levonorgestrel-ethinyl estrad (TORI) 90-20 MCG tablet, Take 1 tablet by mouth daily, Disp: 112 tablet, Rfl: 3     sertraline (ZOLOFT) 100 MG tablet, Take 1 tablet (100 mg) by mouth daily, Disp: 90 tablet, Rfl: 3  No Known Allergies      EXAM:   /73   Pulse 68   Wt 80.5 kg (177 lb 6.4 oz)   SpO2 98%   BMI 25.82 kg/m      Physical Exam   Constitutional:       General: She is not in acute distress.     Appearance: Normal appearance. She is not ill-appearing.   HENT:      Head: Normocephalic and atraumatic.      Nose: She has 2+ bilateral inferior turbinate hypertrophy with nasal mucus bilaterally     Mouth/Throat:      Mouth: Mucous membranes are moist.      Pharynx: Oropharynx is clear. No posterior oropharyngeal erythema.   Eyes:      General:         Right eye: No discharge.         Left eye: No discharge.   Cardiovascular:      Rate and Rhythm: Normal rate and regular rhythm.      Heart sounds: Normal heart  sounds.   Pulmonary:      Effort: Pulmonary effort is normal.      Breath sounds: Normal breath sounds. No wheezing or rhonchi.   Skin:     General: Skin is warm.      Findings: No erythema or rash.   Neurological:      General: No focal deficit present.      Mental Status: She is alert. Mental status is at baseline.   Psychiatric:         Mood and Affect: Mood normal.         Behavior: Behavior normal.       WORKUP: Skin testing was positive to dog and cat and negative to other animals    ENVIRONMENTAL PERCUTANEOUS SKIN TESTING: ADULT  [facility] 7/28/2022   Consent N   Ordering Physician Dr. Crews   Interpreting Physician Dr. Crews   Testing Technician Tyree SENIOR MA   Location Back   Time start: 10:40 AM   Time End: 10:55 AM   Positive Control: Histatrol*ALK 1 mg/ml 0   Negative Control: 50% Glycerin 0   Cat Hair*ALK (10,000 BAU/ml) 5/8   AP Dog Hair/Dander (1:100 w/v) 5/12   Guinea Pig (1:10w/v) 0   Hamster ** Esquivel (W/F in millimeters) 0   Rabbit* ALK (W/F in millimeters) 0   Cow* ALK (W/F in millimeters) 0   Gerbil** Esquivel (W/F in millimeters) 0   Mouse* ALK (W/F in millimeters) 0        ASSESSMENT/PLAN:  Cristel Irving is a 27 year old female seen today for allergic rhinitis.  Having symptoms primarily around animals.  She like to get another cat.  She is happy to know that she can be on allergy shots and had a cat in the home at the same time.  She was not interested in doing any allergy testing to pollens or additional allergens.    1.  Recommended Zyrtec or Allegra as well as Flonase to help control her symptoms when around animals.  2.  She also wants to start allergy shots and she is well aware of the risks and benefits.  We reviewed the allergy immunotherapy handout and she signed the consent form.  She we will plan to have an EpiPen.  3. The patient was counseled regarding the time commitment, billing responsibility depending on the insurance coverage (also the insurance will be billed  once allergen immunotherapy serums are compounded), auto-injectable epinephrine device policy, risks (I stated risks include hives, swelling, shortness of breath.) She is aware of the benefits of allergen immunotherapy and she wishes to proceed.  We went over the informed consent that needed to be signed, agreeing to remain in the clinic for 30 minutes after the injection.      Follow-up in 3 months.      Thank you for allowing me to participate in the care of Cristel Irving.      A total of 40 minutes was spent on the day of the encounter performing chart review, history and exam, documentation, and counseling and coordination of care as noted above.       Cezar Crews MD  Allergy/Immunology  [facility]      Per provider verbal order, placed Cat hair, Dog hair, Guinea pig, Hamster, Rabbit, Cow, Gerbil and Mouse  test.    Once panels were placed, patient was monitored for 15 minutes in clinic.  Provider read test after 15 minutes..  Pt tolerated procedure well.  All questions and concerns were addressed at office visit.         Tyree Bryant MA        Writer demonstrated how to use an EpiPen auto-injector.  Patient instructed to form a fist around the auto-injector, remove blue safety release by pulling straight up, then firmly push orange tip against outer thigh so it clicks, holding for 3 seconds.  Patient advised that once used, needle will not be exposed, as orange tip extends.  Patient advised to call 911 or go to emergency department after epi-pen use for further monitoring.         Writer demonstrated how to use an Auvi-Q Epinephrine auto-injector.  Patient instructed to remove cap from device and follow the instructions given by the recorded audio. This includes removing the red safety release by pulling straight out, then firmly pushing the black tip against outer thigh until it clicks, hold for 2 seconds.  Patient advised that once used, needle will not be exposed, as it  retracts back into the device.  Patient advised to call 911 or go to emergency department after Auvi-Q use for further monitoring.         RN reviewed Anaphylaxis Action Plan with patient. Educated on the symptoms and treatment of anaphylaxis. Went through the different ways that a reaction can present, and the body systems that it can affect. Patient verbalized understanding.     DARRYL Romero, RN      Again, thank you for allowing me to participate in the care of your patient.        Sincerely,        Cezar Crews MD

## 2022-07-28 NOTE — PROGRESS NOTES
Writer demonstrated how to use an EpiPen auto-injector.  Patient instructed to form a fist around the auto-injector, remove blue safety release by pulling straight up, then firmly push orange tip against outer thigh so it clicks, holding for 3 seconds.  Patient advised that once used, needle will not be exposed, as orange tip extends.  Patient advised to call 911 or go to emergency department after epi-pen use for further monitoring.         Writer demonstrated how to use an Auvi-Q Epinephrine auto-injector.  Patient instructed to remove cap from device and follow the instructions given by the recorded audio. This includes removing the red safety release by pulling straight out, then firmly pushing the black tip against outer thigh until it clicks, hold for 2 seconds.  Patient advised that once used, needle will not be exposed, as it retracts back into the device.  Patient advised to call 911 or go to emergency department after Auvi-Q use for further monitoring.         RN reviewed Anaphylaxis Action Plan with patient. Educated on the symptoms and treatment of anaphylaxis. Went through the different ways that a reaction can present, and the body systems that it can affect. Patient verbalized understanding.     Daar Mercado, LALITHAN, RN

## 2022-07-29 ENCOUNTER — MYC MEDICAL ADVICE (OUTPATIENT)
Dept: INTERNAL MEDICINE | Facility: CLINIC | Age: 27
End: 2022-07-29

## 2022-07-29 DIAGNOSIS — J30.81 ALLERGIC RHINITIS DUE TO ANIMAL (CAT) (DOG) HAIR AND DANDER: Primary | ICD-10-CM

## 2022-07-29 NOTE — PROGRESS NOTES
ALLERGY SOLUTION NEW REQUEST    Cristel Irving 1995 MRN: [id number]    DATE NEEDED:  2 weeks  Vial Color Content   Top Dose         Vial Size  Green 1:1,000, Blue 1:100, Yellow 1:10 and Red 1:1 Cat, Dog  Red 1:1 0.5        5mL          Shot Clinic Location:  [place]  Ship to Location: [place]  Billing Location: [place]  Special Instructions:  Call patient when shipped.        Requester Signature  Cezar Crews MD

## 2022-07-31 LAB — CAT DANDER IGG QN: 24.9 KU(A)/L

## 2022-08-01 LAB
MAYO MISC RESULT: NORMAL
MAYO MISC RESULT: NORMAL

## 2022-08-01 RX ORDER — SERTRALINE HYDROCHLORIDE 100 MG/1
100 TABLET, FILM COATED ORAL DAILY
Qty: 90 TABLET | Refills: 1 | Status: SHIPPED | OUTPATIENT
Start: 2022-08-01 | End: 2023-01-24

## 2022-08-01 RX ORDER — BUSPIRONE HYDROCHLORIDE 10 MG/1
10 TABLET ORAL DAILY
Qty: 90 TABLET | Refills: 1 | Status: SHIPPED | OUTPATIENT
Start: 2022-08-01 | End: 2023-01-24

## 2022-08-05 DIAGNOSIS — J30.81 ALLERGIC RHINITIS DUE TO ANIMAL (CAT) (DOG) HAIR AND DANDER: Primary | ICD-10-CM

## 2022-08-05 PROCEDURE — 95165 ANTIGEN THERAPY SERVICES: CPT | Performed by: INTERNAL MEDICINE

## 2022-08-05 NOTE — PROGRESS NOTES
Allergy serums billed to [facility].     Vials billed below:    Vial Color   Content                      Vial Size Expiration Date  Green 1:1,000, Blue 1:100, Yellow 1:10 and Red 1:1  Cat, Dog  5mL each Green 11/5/22, Blue 2/5/23, Yellow & Red 8/5/23    Billed 30 units    Checked by Enrique Oglesby / LPN        Signature  Enrique Oglesby LPN

## 2022-08-15 ENCOUNTER — TELEPHONE (OUTPATIENT)
Dept: ALLERGY | Facility: CLINIC | Age: 27
End: 2022-08-15

## 2022-08-15 NOTE — TELEPHONE ENCOUNTER
Called and left a voicemail for patient to call back to schedule first allergy shot appointment. Left RN direct number for callback. Will also schedule a follow up with Dr. Crews for November.     LALITHA RomeroN, RN

## 2022-08-15 NOTE — PROGRESS NOTES
Allergy serums received at [place].     Vials received below:    Vial Color Content                      Vial Size Expiration Date  Green 1:1,000, Blue 1:100, Yellow 1:10 and Red 1:1 Cat, Dog 5mL  Green: 11/05/22, Blue: 02/05/2023, Yellow & Red: 08/05/2023        Signature  Dara Mercado RN

## 2022-08-16 NOTE — TELEPHONE ENCOUNTER
Patient called and spoke with Allergy MA to schedule first allergy shot appointment. Scheduled 8/23 to receive. Reminded to bring Epipen. Will review Epi education and Anaphylaxis Action plan at appointment time.     LALITHA RomeroN, RN

## 2022-08-23 ENCOUNTER — ALLIED HEALTH/NURSE VISIT (OUTPATIENT)
Dept: ALLERGY | Facility: CLINIC | Age: 27
End: 2022-08-23
Payer: COMMERCIAL

## 2022-08-23 DIAGNOSIS — J30.9 ALLERGIC RHINITIS: ICD-10-CM

## 2022-08-23 DIAGNOSIS — J30.81 ALLERGIC RHINITIS DUE TO ANIMAL (CAT) (DOG) HAIR AND DANDER: Primary | ICD-10-CM

## 2022-08-23 PROCEDURE — 95115 IMMUNOTHERAPY ONE INJECTION: CPT

## 2022-08-23 NOTE — PROGRESS NOTES
Patient presented after waiting 30 minutes with no reaction to allergy injections. Scheduled next several shot appointments. Discharged from clinic.    Dara Mercado, LALITHAN, RN

## 2022-08-30 ENCOUNTER — ALLIED HEALTH/NURSE VISIT (OUTPATIENT)
Dept: ALLERGY | Facility: CLINIC | Age: 27
End: 2022-08-30
Payer: COMMERCIAL

## 2022-08-30 DIAGNOSIS — J30.81 ALLERGIC RHINITIS DUE TO ANIMAL (CAT) (DOG) HAIR AND DANDER: Primary | ICD-10-CM

## 2022-08-30 DIAGNOSIS — J30.9 ALLERGIC RHINITIS: ICD-10-CM

## 2022-08-30 PROCEDURE — 95115 IMMUNOTHERAPY ONE INJECTION: CPT

## 2022-08-30 NOTE — PROGRESS NOTES
Patient presented after waiting 30 minutes with no reaction to allergy injections. Discharged from clinic.    Dara Mercado, BSN, RN

## 2022-09-06 ENCOUNTER — ALLIED HEALTH/NURSE VISIT (OUTPATIENT)
Dept: ALLERGY | Facility: CLINIC | Age: 27
End: 2022-09-06
Payer: COMMERCIAL

## 2022-09-06 DIAGNOSIS — J30.9 ALLERGIC RHINITIS: ICD-10-CM

## 2022-09-06 DIAGNOSIS — J30.81 ALLERGIC RHINITIS DUE TO ANIMAL (CAT) (DOG) HAIR AND DANDER: Primary | ICD-10-CM

## 2022-09-06 PROCEDURE — 95115 IMMUNOTHERAPY ONE INJECTION: CPT

## 2022-09-06 NOTE — PROGRESS NOTES
Patient presented after waiting 30 minutes with no reaction to allergy injections. Discharged from clinic.    Carmen DOTYN, RN  LALITHA RomeroN, RN     Pt sitting in mother's lap. Intermittent rue use. Pt noted to be using right arm to grab mother's cell phone without any notable distress/pain. Awaiting xray results and dispo.

## 2022-09-13 ENCOUNTER — ALLIED HEALTH/NURSE VISIT (OUTPATIENT)
Dept: ALLERGY | Facility: CLINIC | Age: 27
End: 2022-09-13
Payer: COMMERCIAL

## 2022-09-13 DIAGNOSIS — J30.81 ALLERGIC RHINITIS DUE TO ANIMAL (CAT) (DOG) HAIR AND DANDER: Primary | ICD-10-CM

## 2022-09-13 DIAGNOSIS — J30.9 ALLERGIC RHINITIS: ICD-10-CM

## 2022-09-13 PROCEDURE — 95115 IMMUNOTHERAPY ONE INJECTION: CPT

## 2022-09-20 ENCOUNTER — ALLIED HEALTH/NURSE VISIT (OUTPATIENT)
Dept: ALLERGY | Facility: CLINIC | Age: 27
End: 2022-09-20
Payer: COMMERCIAL

## 2022-09-20 DIAGNOSIS — J30.9 ALLERGIC RHINITIS: ICD-10-CM

## 2022-09-20 DIAGNOSIS — J30.81 ALLERGIC RHINITIS DUE TO ANIMAL (CAT) (DOG) HAIR AND DANDER: Primary | ICD-10-CM

## 2022-09-20 PROCEDURE — 95115 IMMUNOTHERAPY ONE INJECTION: CPT

## 2022-09-20 NOTE — PROGRESS NOTES
Patient presented after waiting 30 minutes with no reaction to allergy injections. Discharged from clinic.    Carmen ANDREWS, RN

## 2022-09-27 ENCOUNTER — ALLIED HEALTH/NURSE VISIT (OUTPATIENT)
Dept: ALLERGY | Facility: CLINIC | Age: 27
End: 2022-09-27
Payer: COMMERCIAL

## 2022-09-27 DIAGNOSIS — J30.81 ALLERGIC RHINITIS DUE TO ANIMAL (CAT) (DOG) HAIR AND DANDER: Primary | ICD-10-CM

## 2022-09-27 DIAGNOSIS — J30.9 ALLERGIC RHINITIS: ICD-10-CM

## 2022-09-27 PROCEDURE — 95115 IMMUNOTHERAPY ONE INJECTION: CPT

## 2022-10-11 ENCOUNTER — ALLIED HEALTH/NURSE VISIT (OUTPATIENT)
Dept: ALLERGY | Facility: CLINIC | Age: 27
End: 2022-10-11
Payer: COMMERCIAL

## 2022-10-11 DIAGNOSIS — J30.9 ALLERGIC RHINITIS: ICD-10-CM

## 2022-10-11 DIAGNOSIS — J30.81 ALLERGIC RHINITIS DUE TO ANIMAL (CAT) (DOG) HAIR AND DANDER: Primary | ICD-10-CM

## 2022-10-11 PROCEDURE — 95115 IMMUNOTHERAPY ONE INJECTION: CPT

## 2022-10-13 NOTE — PROGRESS NOTES
Subjective     Cristel Irving is a 24 year old female who presents to clinic today for the following health issues:    HPI     Contraception follow up. Patient states would like to stay on this medication.   Pt denies migraine history and personal or family history of bleeding or clotting disorders.  Patient does not spotting prior to periods that is bothersome.   Pap smear: never, declines    Sexually active: never   Medication is strictly for acne use.         Problems taking medications regularly: No    Medication side effects: none    Diet: regular (no restrictions)    Reviewed and updated as needed this visit by Provider  Meds  Problems             Objective    /74   Pulse 77   Temp 98.7  F (37.1  C) (Oral)   Resp 16   Wt 83.2 kg (183 lb 6.4 oz)   LMP 06/02/2019   SpO2 98%   BMI 26.70 kg/m    Body mass index is 26.7 kg/m .  Physical Exam   GENERAL: healthy, alert and no distress  RESP: lungs clear to auscultation - no rales, rhonchi or wheezes  CV: regular rates and rhythm, normal S1 S2, no S3 or S4 and no murmur, click or rub    Diagnostic Test Results:  Labs reviewed in Epic        Assessment & Plan     1. Encounter for surveillance of contraceptive pills  - trial of tricyclic birth control as below, follow up if continued side effects/concerns   - norgestim-eth estrad triphasic (ORTHO TRI-CYCLEN LO) 0.18/0.215/0.25 MG-25 MCG tablet; Take 1 tablet by mouth daily  Dispense: 84 tablet; Refill: 3    Return in about 1 year (around 6/5/2020).    Mayra Cuenca PA-C  [facility]       Attending Attestation (For Attendings USE Only)... patient

## 2022-10-18 ENCOUNTER — ALLIED HEALTH/NURSE VISIT (OUTPATIENT)
Dept: ALLERGY | Facility: CLINIC | Age: 27
End: 2022-10-18
Payer: COMMERCIAL

## 2022-10-18 DIAGNOSIS — J30.81 ALLERGIC RHINITIS DUE TO ANIMAL (CAT) (DOG) HAIR AND DANDER: Primary | ICD-10-CM

## 2022-10-18 PROCEDURE — 95115 IMMUNOTHERAPY ONE INJECTION: CPT

## 2022-10-25 ENCOUNTER — ALLIED HEALTH/NURSE VISIT (OUTPATIENT)
Dept: ALLERGY | Facility: CLINIC | Age: 27
End: 2022-10-25
Payer: COMMERCIAL

## 2022-10-25 DIAGNOSIS — J30.81 ALLERGIC RHINITIS DUE TO ANIMAL (CAT) (DOG) HAIR AND DANDER: Primary | ICD-10-CM

## 2022-10-25 DIAGNOSIS — J30.9 ALLERGIC RHINITIS: ICD-10-CM

## 2022-10-25 PROCEDURE — 95120 IMMUNOTHERAPY ONE INJECTION: CPT

## 2022-10-25 NOTE — PROGRESS NOTES
This service provided today was under the supervision of Dr. Crews, who was available if needed.     Patient instructed to remain in the lobby for 30 minutes.  Patient presented after waiting 30 minutes with no reaction to allergy injections. Discharged from clinic.    Dara Mercado, LALITHAN, RN

## 2022-11-01 ENCOUNTER — ALLIED HEALTH/NURSE VISIT (OUTPATIENT)
Dept: ALLERGY | Facility: CLINIC | Age: 27
End: 2022-11-01
Payer: COMMERCIAL

## 2022-11-01 DIAGNOSIS — J30.81 ALLERGIC RHINITIS DUE TO ANIMAL (CAT) (DOG) HAIR AND DANDER: Primary | ICD-10-CM

## 2022-11-01 DIAGNOSIS — J30.9 ALLERGIC RHINITIS: ICD-10-CM

## 2022-11-01 PROCEDURE — 95115 IMMUNOTHERAPY ONE INJECTION: CPT

## 2022-11-01 NOTE — PROGRESS NOTES
Cristel Irving presents to clinic today at the request of Cezar Crews MD (ordering provider) for Allergy Immunotherapy injection(s).       This service provided today was under the care of Cezar Crews MD; the supervising provider of the day; who was available if needed.      Patient presented after waiting 30 minutes with no reaction to  injections. Discharged from clinic.    Dara Mercado RN

## 2022-11-04 ENCOUNTER — ALLIED HEALTH/NURSE VISIT (OUTPATIENT)
Dept: ALLERGY | Facility: CLINIC | Age: 27
End: 2022-11-04
Payer: COMMERCIAL

## 2022-11-04 DIAGNOSIS — J30.81 ALLERGIC RHINITIS DUE TO ANIMAL (CAT) (DOG) HAIR AND DANDER: Primary | ICD-10-CM

## 2022-11-04 PROCEDURE — 95120 IMMUNOTHERAPY ONE INJECTION: CPT

## 2022-11-04 NOTE — PROGRESS NOTES
Cristel Irving presents to clinic today at the request of Cezar Crews MD (ordering provider) for Allergy Immunotherapy injection(s).       This service provided today was under the care of Cezar Crews MD; the supervising provider of the day; who was available if needed.      Patient presented after waiting 30 minutes with no reaction to  injections. Discharged from clinic.    Dara Mercado  BSN, RN

## 2022-11-08 ENCOUNTER — ALLIED HEALTH/NURSE VISIT (OUTPATIENT)
Dept: ALLERGY | Facility: CLINIC | Age: 27
End: 2022-11-08
Payer: COMMERCIAL

## 2022-11-08 DIAGNOSIS — J30.81 ALLERGIC RHINITIS DUE TO ANIMAL (CAT) (DOG) HAIR AND DANDER: Primary | ICD-10-CM

## 2022-11-08 PROCEDURE — 95120 IMMUNOTHERAPY ONE INJECTION: CPT

## 2022-11-18 ENCOUNTER — ALLIED HEALTH/NURSE VISIT (OUTPATIENT)
Dept: ALLERGY | Facility: CLINIC | Age: 27
End: 2022-11-18
Payer: COMMERCIAL

## 2022-11-18 DIAGNOSIS — J30.9 ALLERGIC RHINITIS: ICD-10-CM

## 2022-11-18 DIAGNOSIS — J30.81 ALLERGIC RHINITIS DUE TO ANIMAL (CAT) (DOG) HAIR AND DANDER: Primary | ICD-10-CM

## 2022-11-18 PROCEDURE — 95120 IMMUNOTHERAPY ONE INJECTION: CPT

## 2022-11-18 NOTE — PROGRESS NOTES
Cristel Irving presents to clinic today at the request of Cezar Crews MD (ordering provider) for Allergy Immunotherapy injection(s).       This service provided today was under the care of Cezar Crews MD; the supervising provider of the day; who was available if needed.      Patient presented after waiting 30 minutes with no reaction to  injections. Discharged from clinic.    Dara Mercado, LALITHAN, RN

## 2022-11-22 ENCOUNTER — OFFICE VISIT (OUTPATIENT)
Dept: ALLERGY | Facility: CLINIC | Age: 27
End: 2022-11-22
Payer: COMMERCIAL

## 2022-11-22 ENCOUNTER — ALLIED HEALTH/NURSE VISIT (OUTPATIENT)
Dept: ALLERGY | Facility: CLINIC | Age: 27
End: 2022-11-22
Payer: COMMERCIAL

## 2022-11-22 VITALS
BODY MASS INDEX: 25.83 KG/M2 | OXYGEN SATURATION: 99 % | WEIGHT: 177.47 LBS | DIASTOLIC BLOOD PRESSURE: 70 MMHG | SYSTOLIC BLOOD PRESSURE: 108 MMHG | HEART RATE: 78 BPM

## 2022-11-22 DIAGNOSIS — J30.81 ALLERGIC RHINITIS DUE TO ANIMAL (CAT) (DOG) HAIR AND DANDER: Primary | ICD-10-CM

## 2022-11-22 DIAGNOSIS — J30.9 ALLERGIC RHINITIS: ICD-10-CM

## 2022-11-22 PROCEDURE — 95120 IMMUNOTHERAPY ONE INJECTION: CPT

## 2022-11-22 PROCEDURE — 99213 OFFICE O/P EST LOW 20 MIN: CPT | Mod: 25 | Performed by: INTERNAL MEDICINE

## 2022-11-22 NOTE — PROGRESS NOTES
Cristel Irving was seen in the Allergy Clinic at [facility].    Cristel Irving is a 27 year old female being seen today for ongoing evaluation of allergy shots.    She started allergy shots August 2022.    Since the last visit the patient has been slowly improving.  She seems to have a decreased reaction around cats.  She is on [place] so she has cats in her home frequently.  No longer taking Zyrtec or Allegra or Flonase at this time.  She is tolerating allergy shots.  She is currently in the yellow vial.  Not having any allergic reactions.  Not requiring antihistamines prior to shot.    She does have some minor itching eyes and rhinorrhea around animals.    Past Medical History:   Diagnosis Date     JESSIE (generalized anxiety disorder)      MDD (major depressive disorder)      Family History   Problem Relation Age of Onset     Anxiety Disorder Mother      Depression Father      Depression Paternal Grandmother      Diabetes No family hx of      Cerebrovascular Disease No family hx of      Coronary Artery Disease Early Onset No family hx of      Breast Cancer No family hx of      Ovarian Cancer No family hx of      Colon Cancer No family hx of      Past Surgical History:   Procedure Laterality Date     NO HISTORY OF SURGERY           Current Outpatient Medications:      busPIRone (BUSPAR) 10 MG tablet, Take 1 tablet (10 mg) by mouth daily, Disp: 90 tablet, Rfl: 1     levonorgestrel-ethinyl estrad (TORI) 90-20 MCG tablet, Take 1 tablet by mouth daily, Disp: 112 tablet, Rfl: 3     sertraline (ZOLOFT) 100 MG tablet, Take 1 tablet (100 mg) by mouth daily, Disp: 90 tablet, Rfl: 1     EPINEPHrine (ANY BX GENERIC EQUIV) 0.3 MG/0.3ML injection 2-pack, Inject 0.3 mLs (0.3 mg) into the muscle as needed for anaphylaxis, Disp: 2 each, Rfl: 2     ORDER FOR ALLERGEN IMMUNOTHERAPY, Name of Mix: Mix #1  Cat, Dog Cat Hair, Standardized A.P. 10,000 BAU/mL, HS  2.0 ml Dog Hair-Dander, A. P.  1:100 w/v, HS   1.0 ml  Diluent: HSA 2 ml to 5ml, Disp: 5 mL, Rfl: PRN  No Known Allergies      EXAM:   /70   Pulse 78   Wt 80.5 kg (177 lb 7.5 oz)   SpO2 99%   BMI 25.83 kg/m      Constitutional:       General: She is not in acute distress.     Appearance: Normal appearance. She is not ill-appearing.   HENT:      Head: Normocephalic and atraumatic.      Nose: Nose normal. No congestion or rhinorrhea.      Mouth/Throat:      Mouth: Mucous membranes are moist.      Pharynx: Oropharynx is clear. No posterior oropharyngeal erythema.   Eyes:      General:         Right eye: No discharge.         Left eye: No discharge.   Skin:     General: Skin is warm.      Findings: No erythema or rash.   Neurological:      General: No focal deficit present.      Mental Status: She is alert. Mental status is at baseline.   Psychiatric:         Mood and Affect: Mood normal.         Behavior: Behavior normal.         ASSESSMENT/PLAN:  Cristel Irving is a 27 year old female seen today for ongoing evaluation of allergic rhinitis.  Receiving allergy shots to dog and cat.    She will continue with allergy immunotherapy.  Total duration time is approximately 4 to 5 years.  Discussed that she could consider taking antihistamine prior to she injection ff having local reactions which she is not currently.  Plan to follow-up in 3 months    Thank you for allowing me to participate in the care of Cristel Irving.      I spent 20 minutes on the date of the encounter doing chart review, history and exam, documentation and further coordination as noted above exclusive of separately reported interpretations    Cezar Crews MD  Allergy/Immunology  [facility]

## 2022-11-22 NOTE — PATIENT INSTRUCTIONS
Allergy Staff Appt Hours Shot Hours Location         Physician   Cezar Crews MD      Support Staff   AMBREEN Hernandez, RN   Tyree SENIOR, KETAN CHOWDHURY, BEBETO          Mondays  Not available until January/2023 Wednesdays  Close    Tuesdays Thursdays and Fridays:  [place] 7-5                    [place]     Tuesdays: 7:40-3:20      Fridays: 7:40-4:20                [facility]  [address]  [place] [address]  Appt Line: [phone]    Pulmonary Function Scheduling:  [place]: [phone]         Questions about cost of your care?  For questions about your cost of your visit, procedure, lab or imaging contact: [facility] Price Line [phone] or visit: [email or web address]      Thank you for trusting us with your care. Please feel free to contact us with any questions or concerns you may have.

## 2022-11-22 NOTE — LETTER
11/22/2022         RE: Cristel Irving  [address]  [facility] [address]        Dear Colleague,    Thank you for referring your patient, Cristel Irving, to the [facility] SPECIAL[facility]INIC [place]. Please see a copy of my visit note below.    Cristel Irving was seen in the Allergy Clinic at [facility].    Cristel Irving is a 27 year old female being seen today for ongoing evaluation of allergy shots.    She started allergy shots August 2022.    Since the last visit the patient has been slowly improving.  She seems to have a decreased reaction around cats.  She is on [employer] so she has cats in her home frequently.  No longer taking Zyrtec or Allegra or Flonase at this time.  She is tolerating allergy shots.  She is currently in the yellow vial.  Not having any allergic reactions.  Not requiring antihistamines prior to shot.    She does have some minor itching eyes and rhinorrhea around animals.    Past Medical History:   Diagnosis Date     JESSIE (generalized anxiety disorder)      MDD (major depressive disorder)      Family History   Problem Relation Age of Onset     Anxiety Disorder Mother      Depression Father      Depression Paternal Grandmother      Diabetes No family hx of      Cerebrovascular Disease No family hx of      Coronary Artery Disease Early Onset No family hx of      Breast Cancer No family hx of      Ovarian Cancer No family hx of      Colon Cancer No family hx of      Past Surgical History:   Procedure Laterality Date     NO HISTORY OF SURGERY           Current Outpatient Medications:      busPIRone (BUSPAR) 10 MG tablet, Take 1 tablet (10 mg) by mouth daily, Disp: 90 tablet, Rfl: 1     levonorgestrel-ethinyl estrad (TORI) 90-20 MCG tablet, Take 1 tablet by mouth daily, Disp: 112 tablet, Rfl: 3     sertraline (ZOLOFT) 100 MG tablet, Take 1 tablet (100 mg) by mouth daily, Disp: 90 tablet, Rfl: 1     EPINEPHrine (ANY BX GENERIC EQUIV) 0.3  MG/0.3ML injection 2-pack, Inject 0.3 mLs (0.3 mg) into the muscle as needed for anaphylaxis, Disp: 2 each, Rfl: 2     ORDER FOR ALLERGEN IMMUNOTHERAPY, Name of Mix: Mix #1  Cat, Dog Cat Hair, Standardized A.P. 10,000 BAU/mL, HS  2.0 ml Dog Hair-Dander, A. P.  1:100 w/v, HS  1.0 ml  Diluent: HSA 2 ml to 5ml, Disp: 5 mL, Rfl: PRN  No Known Allergies      EXAM:   /70   Pulse 78   Wt 80.5 kg (177 lb 7.5 oz)   SpO2 99%   BMI 25.83 kg/m      Constitutional:       General: She is not in acute distress.     Appearance: Normal appearance. She is not ill-appearing.   HENT:      Head: Normocephalic and atraumatic.      Nose: Nose normal. No congestion or rhinorrhea.      Mouth/Throat:      Mouth: Mucous membranes are moist.      Pharynx: Oropharynx is clear. No posterior oropharyngeal erythema.   Eyes:      General:         Right eye: No discharge.         Left eye: No discharge.   Skin:     General: Skin is warm.      Findings: No erythema or rash.   Neurological:      General: No focal deficit present.      Mental Status: She is alert. Mental status is at baseline.   Psychiatric:         Mood and Affect: Mood normal.         Behavior: Behavior normal.         ASSESSMENT/PLAN:  Cristel Irving is a 27 year old female seen today for ongoing evaluation of allergic rhinitis.  Receiving allergy shots to dog and cat.    She will continue with allergy immunotherapy.  Total duration time is approximately 4 to 5 years.  Discussed that she could consider taking antihistamine prior to she injection ff having local reactions which she is not currently.  Plan to follow-up in 3 months    Thank you for allowing me to participate in the care of Cristel Irving.      I spent 20 minutes on the date of the encounter doing chart review, history and exam, documentation and further coordination as noted above exclusive of separately reported interpretations    Cezar Crews MD  Allergy/Immunology  [facility] -  [facility]      Again, thank you for allowing me to participate in the care of your patient.        Sincerely,        Cezar Crews MD

## 2022-11-28 ENCOUNTER — TELEPHONE (OUTPATIENT)
Dept: ALLERGY | Facility: CLINIC | Age: 27
End: 2022-11-28

## 2022-11-28 NOTE — TELEPHONE ENCOUNTER
[facility]    Phone Message    May a detailed message be left on voicemail: yes     Reason for Call: Other: Patient would like to come in tomorrow 11/29/2022 for an allergy shot   Please call back to discuss  Thank you    Action Taken: Message routed to:  [facility]    Travel Screening: Not Applicable

## 2022-11-28 NOTE — TELEPHONE ENCOUNTER
Called and scheduled for her shot appointment on 11/29/22 at 9 AM.      Tyree Bryant MA     Patient informed

## 2022-11-29 ENCOUNTER — ALLIED HEALTH/NURSE VISIT (OUTPATIENT)
Dept: ALLERGY | Facility: CLINIC | Age: 27
End: 2022-11-29
Payer: COMMERCIAL

## 2022-11-29 DIAGNOSIS — J30.81 ALLERGIC RHINITIS DUE TO ANIMAL (CAT) (DOG) HAIR AND DANDER: Primary | ICD-10-CM

## 2022-11-29 DIAGNOSIS — J30.9 ALLERGIC RHINITIS: ICD-10-CM

## 2022-11-29 PROCEDURE — 95120 IMMUNOTHERAPY ONE INJECTION: CPT

## 2022-11-29 NOTE — PROGRESS NOTES
Cristel Irving presents to clinic today at the request of Cezar Crews MD (ordering provider) for Allergy Immunotherapy injection(s).       This service provided today was under the care of Cezar Crews MD; the supervising provider of the day; who was available if needed.      Patient presented after waiting 30 minutes with no reaction to  injections. Discharged from clinic.    AMBREEN Miller RN

## 2022-12-02 ENCOUNTER — ALLIED HEALTH/NURSE VISIT (OUTPATIENT)
Dept: ALLERGY | Facility: CLINIC | Age: 27
End: 2022-12-02
Payer: COMMERCIAL

## 2022-12-02 DIAGNOSIS — J30.81 ALLERGIC RHINITIS DUE TO ANIMAL (CAT) (DOG) HAIR AND DANDER: Primary | ICD-10-CM

## 2022-12-02 PROCEDURE — 95120 IMMUNOTHERAPY ONE INJECTION: CPT

## 2022-12-16 ENCOUNTER — ALLIED HEALTH/NURSE VISIT (OUTPATIENT)
Dept: ALLERGY | Facility: CLINIC | Age: 27
End: 2022-12-16
Payer: COMMERCIAL

## 2022-12-16 DIAGNOSIS — J30.9 ALLERGIC RHINITIS: ICD-10-CM

## 2022-12-16 DIAGNOSIS — J30.81 ALLERGIC RHINITIS DUE TO ANIMAL (CAT) (DOG) HAIR AND DANDER: Primary | ICD-10-CM

## 2022-12-16 PROCEDURE — 95120 IMMUNOTHERAPY ONE INJECTION: CPT

## 2022-12-23 ENCOUNTER — ALLIED HEALTH/NURSE VISIT (OUTPATIENT)
Dept: ALLERGY | Facility: CLINIC | Age: 27
End: 2022-12-23
Payer: COMMERCIAL

## 2022-12-23 DIAGNOSIS — J30.81 ALLERGIC RHINITIS DUE TO ANIMAL (CAT) (DOG) HAIR AND DANDER: Primary | ICD-10-CM

## 2022-12-23 DIAGNOSIS — J30.9 ALLERGIC RHINITIS: ICD-10-CM

## 2022-12-23 PROCEDURE — 95120 IMMUNOTHERAPY ONE INJECTION: CPT

## 2022-12-30 ENCOUNTER — LAB (OUTPATIENT)
Dept: LAB | Facility: CLINIC | Age: 27
End: 2022-12-30
Payer: COMMERCIAL

## 2022-12-30 DIAGNOSIS — Z11.1 SCREENING EXAMINATION FOR PULMONARY TUBERCULOSIS: ICD-10-CM

## 2022-12-30 DIAGNOSIS — Z11.1 SCREENING EXAMINATION FOR PULMONARY TUBERCULOSIS: Primary | ICD-10-CM

## 2022-12-30 PROCEDURE — 86481 TB AG RESPONSE T-CELL SUSP: CPT

## 2022-12-30 PROCEDURE — 36415 COLL VENOUS BLD VENIPUNCTURE: CPT

## 2023-01-02 LAB
GAMMA INTERFERON BACKGROUND BLD IA-ACNC: 0.09 IU/ML
M TB IFN-G BLD-IMP: NEGATIVE
M TB IFN-G CD4+ BCKGRND COR BLD-ACNC: 7.17 IU/ML
MITOGEN IGNF BCKGRD COR BLD-ACNC: 0.3 IU/ML
MITOGEN IGNF BCKGRD COR BLD-ACNC: 0.33 IU/ML
QUANTIFERON MITOGEN: 7.26 IU/ML
QUANTIFERON NIL TUBE: 0.09 IU/ML
QUANTIFERON TB1 TUBE: 0.42 IU/ML
QUANTIFERON TB2 TUBE: 0.39

## 2023-01-13 ENCOUNTER — ALLIED HEALTH/NURSE VISIT (OUTPATIENT)
Dept: ALLERGY | Facility: CLINIC | Age: 28
End: 2023-01-13
Payer: COMMERCIAL

## 2023-01-13 DIAGNOSIS — J30.9 ALLERGIC RHINITIS: ICD-10-CM

## 2023-01-13 DIAGNOSIS — J30.81 ALLERGIC RHINITIS DUE TO ANIMAL (CAT) (DOG) HAIR AND DANDER: Primary | ICD-10-CM

## 2023-01-13 PROCEDURE — 95120 IMMUNOTHERAPY ONE INJECTION: CPT

## 2023-01-13 NOTE — PROGRESS NOTES
Cristel Irving presents to clinic today at the request of Czear Crews MD (ordering provider) for Allergy Immunotherapy injection(s).       This service provided today was under the care of Cezar Crews MD; the supervising provider of the day; who was available if needed.      Patient presented after waiting 30 minutes with no reaction to  injections. Discharged from clinic.    Dara Mercado, LALITHAN, RN

## 2023-01-24 ENCOUNTER — ALLIED HEALTH/NURSE VISIT (OUTPATIENT)
Dept: ALLERGY | Facility: CLINIC | Age: 28
End: 2023-01-24
Payer: COMMERCIAL

## 2023-01-24 DIAGNOSIS — J30.9 ALLERGIC RHINITIS: Primary | ICD-10-CM

## 2023-01-24 PROCEDURE — 95120 IMMUNOTHERAPY ONE INJECTION: CPT

## 2023-01-24 NOTE — PROGRESS NOTES
Cristel Irving presents to clinic today at the request of Cezar Crews MD (ordering provider) for Allergy Immunotherapy injection(s)       This service provided today was under the care of Cezar Crews MD; the supervising provider of the day; who was available if needed.      Patient presented after waiting 30 minutes with no reaction to  injections. Discharged from clinic.    Didi Mott RN

## 2023-01-31 ENCOUNTER — ALLIED HEALTH/NURSE VISIT (OUTPATIENT)
Dept: ALLERGY | Facility: CLINIC | Age: 28
End: 2023-01-31
Payer: COMMERCIAL

## 2023-01-31 DIAGNOSIS — J30.9 ALLERGIC RHINITIS: Primary | ICD-10-CM

## 2023-01-31 PROCEDURE — 95120 IMMUNOTHERAPY ONE INJECTION: CPT

## 2023-02-13 ENCOUNTER — ALLIED HEALTH/NURSE VISIT (OUTPATIENT)
Dept: ALLERGY | Facility: CLINIC | Age: 28
End: 2023-02-13
Payer: COMMERCIAL

## 2023-02-13 DIAGNOSIS — J30.9 ALLERGIC RHINITIS: ICD-10-CM

## 2023-02-13 DIAGNOSIS — J30.81 ALLERGIC RHINITIS DUE TO ANIMAL (CAT) (DOG) HAIR AND DANDER: Primary | ICD-10-CM

## 2023-02-13 PROCEDURE — 95120 IMMUNOTHERAPY ONE INJECTION: CPT

## 2023-02-20 ENCOUNTER — ALLIED HEALTH/NURSE VISIT (OUTPATIENT)
Dept: ALLERGY | Facility: CLINIC | Age: 28
End: 2023-02-20
Payer: COMMERCIAL

## 2023-02-20 DIAGNOSIS — J30.81 ALLERGIC RHINITIS DUE TO ANIMAL (CAT) (DOG) HAIR AND DANDER: Primary | ICD-10-CM

## 2023-02-20 DIAGNOSIS — J30.9 ALLERGIC RHINITIS: ICD-10-CM

## 2023-02-20 PROCEDURE — 95120 IMMUNOTHERAPY ONE INJECTION: CPT

## 2023-02-20 NOTE — PROGRESS NOTES
Cristel Irving presents to clinic today at the request of Cezar Crews MD (ordering provider) for Allergy Immunotherapy injection(s).     This service provided today was under the care of Cezar Crews MD; the supervising provider of the day; who was available if needed.    Patient presented after waiting 30 minutes with no reaction to  injections. Discharged from clinic.    Rich ODELL RN, BSN

## 2023-02-27 ENCOUNTER — ALLIED HEALTH/NURSE VISIT (OUTPATIENT)
Dept: ALLERGY | Facility: CLINIC | Age: 28
End: 2023-02-27
Payer: COMMERCIAL

## 2023-02-27 ENCOUNTER — OFFICE VISIT (OUTPATIENT)
Dept: ALLERGY | Facility: CLINIC | Age: 28
End: 2023-02-27
Payer: COMMERCIAL

## 2023-02-27 ENCOUNTER — TELEPHONE (OUTPATIENT)
Dept: ALLERGY | Facility: CLINIC | Age: 28
End: 2023-02-27

## 2023-02-27 VITALS
BODY MASS INDEX: 26.84 KG/M2 | HEART RATE: 70 BPM | WEIGHT: 184.4 LBS | SYSTOLIC BLOOD PRESSURE: 107 MMHG | OXYGEN SATURATION: 98 % | DIASTOLIC BLOOD PRESSURE: 71 MMHG

## 2023-02-27 DIAGNOSIS — J30.9 ALLERGIC RHINITIS: ICD-10-CM

## 2023-02-27 DIAGNOSIS — J30.81 ALLERGIC RHINITIS DUE TO ANIMAL (CAT) (DOG) HAIR AND DANDER: ICD-10-CM

## 2023-02-27 DIAGNOSIS — J30.81 ALLERGIC RHINITIS DUE TO ANIMAL (CAT) (DOG) HAIR AND DANDER: Primary | ICD-10-CM

## 2023-02-27 DIAGNOSIS — R06.2 WHEEZING: Primary | ICD-10-CM

## 2023-02-27 PROCEDURE — 99214 OFFICE O/P EST MOD 30 MIN: CPT | Mod: 25 | Performed by: INTERNAL MEDICINE

## 2023-02-27 PROCEDURE — 95120 IMMUNOTHERAPY ONE INJECTION: CPT

## 2023-02-27 RX ORDER — ALBUTEROL SULFATE 90 UG/1
2 AEROSOL, METERED RESPIRATORY (INHALATION) EVERY 4 HOURS PRN
Qty: 18 G | Refills: 1 | Status: SHIPPED | OUTPATIENT
Start: 2023-02-27 | End: 2024-05-14

## 2023-02-27 NOTE — LETTER
2/27/2023         RE: Cristel Irving  [address]  [facility] [address]        Dear Colleague,    Thank you for referring your patient, Cristel Irving, to the [facility] SPECIALTY [facility]. Please see a copy of my visit note below.    Cristel Irving was seen in the Allergy Clinic at [facility].    Cristel Irving is a 28 year old female being seen today for ongoing evaluation of allergies and allergy immunotherapy follow-up.    Since the last visit the patient has been about the same. Does currently have exposure to cats.     She is currently getting close to the full dose of the red vial (0.4 ml).  Still not receiving any benefit with the allergy shots.  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  Certain cats do seem to exacerbate her symptoms.  It is the handling of the cat or petting the cat when she has problem.  If the cat is in the home in a different room she will not have symptoms.  If she is petting or playing with the cat she will have hives on her neck, itchy eyes, runny nose, nasal congestion and sneezing.  When she has cats in the home and when she goes to work out an outside facility she can have wheezing and cough.    She currently has a cat at home for 2 months.    With dog exposure she will have increased symptoms also.    She has not received benefit with the allergy shots but is willing to continue.    Past Medical History:   Diagnosis Date     JESSIE (generalized anxiety disorder)      MDD (major depressive disorder)      Family History   Problem Relation Age of Onset     Anxiety Disorder Mother      Depression Father      Depression Paternal Grandmother      Diabetes No family hx of      Cerebrovascular Disease No family hx of      Coronary Artery Disease Early Onset No family hx of      Breast Cancer No family hx of      Ovarian Cancer No family hx of      Colon Cancer No family hx of       Past Surgical History:   Procedure Laterality Date     NO HISTORY OF SURGERY           Current Outpatient Medications:      busPIRone (BUSPAR) 10 MG tablet, Take 1 tablet (10 mg) by mouth daily, Disp: 90 tablet, Rfl: 0     EPINEPHrine (ANY BX GENERIC EQUIV) 0.3 MG/0.3ML injection 2-pack, Inject 0.3 mLs (0.3 mg) into the muscle as needed for anaphylaxis, Disp: 2 each, Rfl: 2     levonorgestrel-ethinyl estrad (TORI) 90-20 MCG tablet, Take 1 tablet by mouth daily, Disp: 112 tablet, Rfl: 3     ORDER FOR ALLERGEN IMMUNOTHERAPY, Name of Mix: Mix #1  Cat, Dog Cat Hair, Standardized A.P. 10,000 BAU/mL, HS  2.0 ml Dog Hair-Dander, A. P.  1:100 w/v, HS  1.0 ml  Diluent: HSA 2 ml to 5ml, Disp: 5 mL, Rfl: PRN     sertraline (ZOLOFT) 100 MG tablet, Take 1 tablet (100 mg) by mouth daily, Disp: 90 tablet, Rfl: 0  No Known Allergies      EXAM:   /71 (BP Location: Left arm, Patient Position: Sitting, Cuff Size: Adult Regular)   Pulse 70   Wt 83.6 kg (184 lb 6.4 oz)   SpO2 98%   BMI 26.84 kg/m      Constitutional:       General: She is not in acute distress.     Appearance: Normal appearance. She is not ill-appearing.   HENT:      Head: Normocephalic and atraumatic.      Nose: Mild turbinate hypertrophy bilaterally     Mouth/Throat:      Mouth: Mucous membranes are moist.      Pharynx: Oropharynx is clear. No posterior oropharyngeal erythema.   Eyes:      General:         Right eye: No discharge.         Left eye: No discharge.   Cardiovascular:      Rate and Rhythm: Normal rate and regular rhythm.      Heart sounds: Normal heart sounds.   Pulmonary:      Effort: Pulmonary effort is normal.      Breath sounds: Normal breath sounds. No wheezing or rhonchi.   Skin:     General: Skin is warm.      Findings: No erythema or rash.   Neurological:      General: No focal deficit present.      Mental Status: She is alert. Mental status is at baseline.   Psychiatric:         Mood and Affect: Mood normal.         Behavior:  Behavior normal.     ASSESSMENT/PLAN:  Cristel Irving is a 28 year old female seen today for ongoing evaluation of allergic rhinitis.  She is allergic to dogs and cats and is receiving allergy shots to both.  Although she is not receiving benefit, she will plan to continue for at least 1 year.  She has not received the full dose as of yet.  Having minor local injection site reactions but no systemic symptoms.    Plan to follow-up in 5 months.    She does have some wheezing and cough with exertion while exercising.  Albuterol was prescribed.  She was instructed on the use.  This will be 2 puffs every 4 hours as needed.  May use 15 minutes before exercise.      Thank you for allowing me to participate in the care of Cristel Irving.      I spent 25 minutes on the date of the encounter doing chart review, history and exam, documentation and further coordination as noted above exclusive of separately reported interpretations    Cezar Crews MD  Allergy/Immunology  [facility]      Again, thank you for allowing me to participate in the care of your patient.        Sincerely,        Cezar Crews MD

## 2023-02-27 NOTE — PROGRESS NOTES
Cristel Irving presents to clinic today at the request of Cezar Crews MD (ordering provider) for Allergy Immunotherapy injection(s).       This service provided today was under the care of Cezar Crews MD; the supervising provider of the day; who was available if needed.      Patient presented after waiting 30 minutes with no reaction to  injections. Discharged from clinic.    Serum reorder consent form signed at visit today per protocol.     LALITHA RomeroN, RN

## 2023-02-27 NOTE — PROGRESS NOTES
Cristel Irving was seen in the Allergy Clinic at [facility].    Cristel Irving is a 28 year old female being seen today for ongoing evaluation of allergies and allergy immunotherapy follow-up.    Since the last visit the patient has been about the same. Does currently have exposure to cats.     She is currently getting close to the full dose of the red vial (0.4 ml).  Still not receiving any benefit with the allergy shots.  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  Certain cats do seem to exacerbate her symptoms.  It is the handling of the cat or petting the cat when she has problem.  If the cat is in the home in a different room she will not have symptoms.  If she is petting or playing with the cat she will have hives on her neck, itchy eyes, runny nose, nasal congestion and sneezing.  When she has cats in the home and when she goes to work out an outside facility she can have wheezing and cough.    She currently has a cat at home for 2 months.    With dog exposure she will have increased symptoms also.    She has not received benefit with the allergy shots but is willing to continue.    Past Medical History:   Diagnosis Date     JESSIE (generalized anxiety disorder)      MDD (major depressive disorder)      Family History   Problem Relation Age of Onset     Anxiety Disorder Mother      Depression Father      Depression Paternal Grandmother      Diabetes No family hx of      Cerebrovascular Disease No family hx of      Coronary Artery Disease Early Onset No family hx of      Breast Cancer No family hx of      Ovarian Cancer No family hx of      Colon Cancer No family hx of      Past Surgical History:   Procedure Laterality Date     NO HISTORY OF SURGERY           Current Outpatient Medications:      busPIRone (BUSPAR) 10 MG tablet, Take 1 tablet (10 mg) by mouth daily, Disp: 90 tablet, Rfl: 0     EPINEPHrine (ANY BX GENERIC EQUIV) 0.3 MG/0.3ML  injection 2-pack, Inject 0.3 mLs (0.3 mg) into the muscle as needed for anaphylaxis, Disp: 2 each, Rfl: 2     levonorgestrel-ethinyl estrad (TORI) 90-20 MCG tablet, Take 1 tablet by mouth daily, Disp: 112 tablet, Rfl: 3     ORDER FOR ALLERGEN IMMUNOTHERAPY, Name of Mix: Mix #1  Cat, Dog Cat Hair, Standardized A.P. 10,000 BAU/mL, HS  2.0 ml Dog Hair-Dander, A. P.  1:100 w/v, HS  1.0 ml  Diluent: HSA 2 ml to 5ml, Disp: 5 mL, Rfl: PRN     sertraline (ZOLOFT) 100 MG tablet, Take 1 tablet (100 mg) by mouth daily, Disp: 90 tablet, Rfl: 0  No Known Allergies      EXAM:   /71 (BP Location: Left arm, Patient Position: Sitting, Cuff Size: Adult Regular)   Pulse 70   Wt 83.6 kg (184 lb 6.4 oz)   SpO2 98%   BMI 26.84 kg/m      Constitutional:       General: She is not in acute distress.     Appearance: Normal appearance. She is not ill-appearing.   HENT:      Head: Normocephalic and atraumatic.      Nose: Mild turbinate hypertrophy bilaterally     Mouth/Throat:      Mouth: Mucous membranes are moist.      Pharynx: Oropharynx is clear. No posterior oropharyngeal erythema.   Eyes:      General:         Right eye: No discharge.         Left eye: No discharge.   Cardiovascular:      Rate and Rhythm: Normal rate and regular rhythm.      Heart sounds: Normal heart sounds.   Pulmonary:      Effort: Pulmonary effort is normal.      Breath sounds: Normal breath sounds. No wheezing or rhonchi.   Skin:     General: Skin is warm.      Findings: No erythema or rash.   Neurological:      General: No focal deficit present.      Mental Status: She is alert. Mental status is at baseline.   Psychiatric:         Mood and Affect: Mood normal.         Behavior: Behavior normal.     ASSESSMENT/PLAN:  Cristel Irving is a 28 year old female seen today for ongoing evaluation of allergic rhinitis.  She is allergic to dogs and cats and is receiving allergy shots to both.  Although she is not receiving benefit, she will plan to continue  for at least 1 year.  She has not received the full dose as of yet.  Having minor local injection site reactions but no systemic symptoms.    Plan to follow-up in 5 months.    She does have some wheezing and cough with exertion while exercising.  Albuterol was prescribed.  She was instructed on the use.  This will be 2 puffs every 4 hours as needed.  May use 15 minutes before exercise.      Thank you for allowing me to participate in the care of Cristel Irving.      I spent 25 minutes on the date of the encounter doing chart review, history and exam, documentation and further coordination as noted above exclusive of separately reported interpretations    Cezar Crews MD  Allergy/Immunology  [facility]

## 2023-02-27 NOTE — TELEPHONE ENCOUNTER
ALLERGY SOLUTION RE-ORDER REQUEST    Cristel Irving 1995 MRN: [id number]    DATE NEEDED:  2 weeks  Vial Color Content    Vial Size  Red 1:1 Cat, Dog    5 mL        Serum reorder consent signed and patient/parent was advised that new serums would be ordered through the pharmacy and billed to their insurance company when they arrive in clinic. Yes    Shot Clinic Location:  [facility].  Ship to Location: [facility].  Serum billed to:  [facility].    Special Instructions:  none      Requester Signature  Rich Salamanca, RN, BSN

## 2023-03-02 DIAGNOSIS — J30.81 ALLERGIC RHINITIS DUE TO ANIMAL (CAT) (DOG) HAIR AND DANDER: Primary | ICD-10-CM

## 2023-03-02 PROCEDURE — 95165 ANTIGEN THERAPY SERVICES: CPT | Performed by: INTERNAL MEDICINE

## 2023-03-02 NOTE — PROGRESS NOTES
Allergy serums billed to [facility].     Vials billed below:    Vial Color Content                      Vial Size Expiration Date  Red 1:1 Cat, Dog 5mL  03/03/2024    Billed 10 units    Checked by Enrique Oglesby / BEBETO          Signature  Enrique Oglesby LPN

## 2023-03-06 ENCOUNTER — ALLIED HEALTH/NURSE VISIT (OUTPATIENT)
Dept: ALLERGY | Facility: CLINIC | Age: 28
End: 2023-03-06
Payer: COMMERCIAL

## 2023-03-06 DIAGNOSIS — J30.81 ALLERGIC RHINITIS DUE TO ANIMAL (CAT) (DOG) HAIR AND DANDER: Primary | ICD-10-CM

## 2023-03-06 DIAGNOSIS — J30.9 ALLERGIC RHINITIS: ICD-10-CM

## 2023-03-06 PROCEDURE — 95120 IMMUNOTHERAPY ONE INJECTION: CPT

## 2023-03-08 NOTE — PROGRESS NOTES
Allergy serums received at [facility].    Vials received below:    Vial Color Content                      Vial Size Expiration Date  Red 1:1 Cat, Dog 5 mL  03/02/24    Signature  Tyree Bryant MA

## 2023-03-14 ENCOUNTER — ALLIED HEALTH/NURSE VISIT (OUTPATIENT)
Dept: ALLERGY | Facility: CLINIC | Age: 28
End: 2023-03-14
Payer: COMMERCIAL

## 2023-03-14 DIAGNOSIS — J30.81 ALLERGIC RHINITIS DUE TO ANIMAL (CAT) (DOG) HAIR AND DANDER: Primary | ICD-10-CM

## 2023-03-14 DIAGNOSIS — J30.9 ALLERGIC RHINITIS: ICD-10-CM

## 2023-03-14 PROCEDURE — 95120 IMMUNOTHERAPY ONE INJECTION: CPT

## 2023-03-14 NOTE — PROGRESS NOTES
Cristel Irving presents to clinic today at the request of Cezar Crews MD (ordering provider) for Allergy Immunotherapy injection(s).     This service provided today was under the care of Cezar Crews MD; the supervising provider of the day; who was available if needed.    Patient presented after waiting 30 minutes having a local reaction at injection site measuring 15mm x 30mm. Dr. Crews evaluated site and patient and cleared for discharge. Patient instructed to take 10mg Zyrtec the morning of injections. Discharged from clinic.    Rich ODELL RN, BSN

## 2023-03-26 ENCOUNTER — HEALTH MAINTENANCE LETTER (OUTPATIENT)
Age: 28
End: 2023-03-26

## 2023-03-28 ENCOUNTER — ALLIED HEALTH/NURSE VISIT (OUTPATIENT)
Dept: ALLERGY | Facility: CLINIC | Age: 28
End: 2023-03-28
Payer: COMMERCIAL

## 2023-03-28 DIAGNOSIS — J30.81 ALLERGIC RHINITIS DUE TO ANIMAL (CAT) (DOG) HAIR AND DANDER: Primary | ICD-10-CM

## 2023-03-28 DIAGNOSIS — J30.9 ALLERGIC RHINITIS: ICD-10-CM

## 2023-03-28 PROCEDURE — 95120 IMMUNOTHERAPY ONE INJECTION: CPT

## 2023-04-11 ENCOUNTER — ALLIED HEALTH/NURSE VISIT (OUTPATIENT)
Dept: ALLERGY | Facility: CLINIC | Age: 28
End: 2023-04-11
Payer: MEDICAID

## 2023-04-11 DIAGNOSIS — J30.81 ALLERGIC RHINITIS DUE TO ANIMAL (CAT) (DOG) HAIR AND DANDER: Primary | ICD-10-CM

## 2023-04-11 DIAGNOSIS — J30.9 ALLERGIC RHINITIS: ICD-10-CM

## 2023-04-11 PROCEDURE — 95120 IMMUNOTHERAPY ONE INJECTION: CPT

## 2023-05-01 DIAGNOSIS — F33.42 RECURRENT MAJOR DEPRESSIVE DISORDER, IN FULL REMISSION (H): ICD-10-CM

## 2023-05-01 DIAGNOSIS — F41.1 GENERALIZED ANXIETY DISORDER: ICD-10-CM

## 2023-05-01 RX ORDER — BUSPIRONE HYDROCHLORIDE 10 MG/1
10 TABLET ORAL DAILY
Qty: 90 TABLET | Refills: 0 | Status: SHIPPED | OUTPATIENT
Start: 2023-05-01 | End: 2023-05-03

## 2023-05-01 RX ORDER — SERTRALINE HYDROCHLORIDE 100 MG/1
100 TABLET, FILM COATED ORAL DAILY
Qty: 90 TABLET | Refills: 0 | Status: SHIPPED | OUTPATIENT
Start: 2023-05-01 | End: 2023-05-03

## 2023-05-05 ENCOUNTER — ANCILLARY PROCEDURE (OUTPATIENT)
Dept: GENERAL RADIOLOGY | Facility: CLINIC | Age: 28
End: 2023-05-05
Attending: INTERNAL MEDICINE
Payer: COMMERCIAL

## 2023-05-05 ENCOUNTER — OFFICE VISIT (OUTPATIENT)
Dept: INTERNAL MEDICINE | Facility: CLINIC | Age: 28
End: 2023-05-05
Payer: COMMERCIAL

## 2023-05-05 VITALS
DIASTOLIC BLOOD PRESSURE: 76 MMHG | OXYGEN SATURATION: 98 % | HEART RATE: 76 BPM | BODY MASS INDEX: 25.13 KG/M2 | WEIGHT: 175.5 LBS | TEMPERATURE: 98.3 F | SYSTOLIC BLOOD PRESSURE: 100 MMHG | HEIGHT: 70 IN

## 2023-05-05 DIAGNOSIS — F33.42 RECURRENT MAJOR DEPRESSIVE DISORDER, IN FULL REMISSION (H): ICD-10-CM

## 2023-05-05 DIAGNOSIS — F41.1 GAD (GENERALIZED ANXIETY DISORDER): ICD-10-CM

## 2023-05-05 DIAGNOSIS — M25.561 CHRONIC PAIN OF RIGHT KNEE: ICD-10-CM

## 2023-05-05 DIAGNOSIS — G89.29 CHRONIC PAIN OF RIGHT KNEE: ICD-10-CM

## 2023-05-05 DIAGNOSIS — Z00.00 ROUTINE HISTORY AND PHYSICAL EXAMINATION OF ADULT: Primary | ICD-10-CM

## 2023-05-05 PROCEDURE — 73560 X-RAY EXAM OF KNEE 1 OR 2: CPT | Mod: TC | Performed by: RADIOLOGY

## 2023-05-05 PROCEDURE — 99395 PREV VISIT EST AGE 18-39: CPT | Performed by: INTERNAL MEDICINE

## 2023-05-05 ASSESSMENT — PATIENT HEALTH QUESTIONNAIRE - PHQ9
SUM OF ALL RESPONSES TO PHQ QUESTIONS 1-9: 10
10. IF YOU CHECKED OFF ANY PROBLEMS, HOW DIFFICULT HAVE THESE PROBLEMS MADE IT FOR YOU TO DO YOUR WORK, TAKE CARE OF THINGS AT HOME, OR GET ALONG WITH OTHER PEOPLE: SOMEWHAT DIFFICULT
SUM OF ALL RESPONSES TO PHQ QUESTIONS 1-9: 10

## 2023-05-05 ASSESSMENT — PAIN SCALES - GENERAL: PAINLEVEL: NO PAIN (0)

## 2023-05-05 NOTE — PROGRESS NOTES
ASSESSMENT/PLAN                                                       (Z00.00) Routine history and physical examination of adult  (primary encounter diagnosis)  Comment: PMH, PSH, FH, SH, medications, allergies, immunizations, and preventative health measures reviewed and updated as appropriate.  Plan: see below for plans.      (M25.561,  G89.29) Chronic pain of right knee  Plan: weightbearing knee x-rays today; referred to sports medicine for further evaluation - patient will be contacted to schedule.      (F33.42) Recurrent major depressive disorder, in full remission (H)  (F41.1) JESSIE (generalized anxiety disorder)  Comment: well-controlled on current regimen.      Rosa Goodman MD   [facility]  [address] [place] [id number]  T: [phone], F: [phone]    SUBJECTIVE                                                      Cristel Irving is a very pleasant 28 year old female who presents for a physical.    Patient complains of right knee pain. Ongoing for couple of years. No known injury or trauma. Pain feels like it is underneath her kneecap and occurs when she does stairs, lunges, and squats. The pain is initially sharp but then can be tender for a few days afterwards. No knee redness or swelling. No knee locking or giving out.    ROS:  Constitutional: no unintentional weight loss or gain reported; no fevers, chills, or sweats reported  Cardiovascular: no chest pain, palpitations, or edema reported  Respiratory: no cough, wheezing, shortness of breath, or dyspnea on exertion reported  Gastrointestinal: no nausea, vomiting, constipation, diarrhea, or abdominal pain reported  Genitourinary: no urinary frequency, urgency, dysuria, or hematuria reported  Integumentary: no rash or pruritus reported  Musculoskeletal: see above  Neurologic: no focal weakness, numbness, or tingling reported  Hematologic: no easy bruising or bleeding reported  Endocrine: no heat or cold intolerance  reported; no polyuria or polydipsia reported  Psychiatric: see PMH below    Past Medical History:   Diagnosis Date     JESSIE (generalized anxiety disorder)      MDD (major depressive disorder)      Past Surgical History:   Procedure Laterality Date     RHINOPLASTY  2022     Family History   Problem Relation Age of Onset     Anxiety Disorder Mother      Depression Father      Depression Paternal Grandmother      Diabetes No family hx of      Cerebrovascular Disease No family hx of      Coronary Artery Disease Early Onset No family hx of      Breast Cancer No family hx of      Ovarian Cancer No family hx of      Colon Cancer No family hx of      Social History     Occupational History     Occupation: Nursing School   Tobacco Use     Smoking status: Never     Smokeless tobacco: Never   Vaping Use     Vaping status: Never Used   Substance and Sexual Activity     Alcohol use: No     Drug use: No     Sexual activity: Never   Social History Narrative    Single.    No kids.    Works out 5 days/week.     No Known Allergies     Current Outpatient Medications   Medication Sig     albuterol (PROAIR HFA/PROVENTIL HFA/VENTOLIN HFA) 108 (90 Base) MCG/ACT inhaler Inhale 2 puffs into the lungs every 4 hours as needed for shortness of breath, wheezing or cough     busPIRone (BUSPAR) 10 MG tablet Take 1 tablet (10 mg) by mouth daily     levonorgestrel-ethinyl estrad (TORI) 90-20 MCG tablet Take 1 tablet by mouth daily     ORDER FOR ALLERGEN IMMUNOTHERAPY Name of Mix: Mix #1  Cat, Dog  Cat Hair, Standardized A.P. 10,000 BAU/mL, HS  2.0 ml  Dog Hair-Dander, A. P.  1:100 w/v, HS  1.0 ml   Diluent: HSA 2 ml to 5ml     sertraline (ZOLOFT) 100 MG tablet Take 1 tablet (100 mg) by mouth daily     Immunization History   Administered Date(s) Administered     COVID-19 Bivalent 12+ (Pfizer) 10/24/2022     COVID-19 Vaccine ([place]) 04/09/2021     DTAP (<7y) 1996, 1996, 1996, 05/09/1997, 01/16/2001     HEPA 03/30/2010,  "10/08/2010     HIB (PRP-T) 1996, 1996, 1996, 05/09/1997     HPV 04/10/2008, 06/10/2008, 10/10/2008     HepB 1996, 1996, 1996     Influenza (H1N1) 01/16/2010     Influenza Intranasal Vaccine 10/10/2008, 10/08/2010, 09/30/2012, 09/27/2014, 10/14/2015     Influenza Vaccine, 6+MO IM (QUADRIVALENT W/PRESERVATIVES) 12/16/2018     MMR 05/16/1997, 01/16/2001     Meningococcal (Menomune ) 04/10/2008, 07/21/2014     Poliovirus, inactivated (IPV) 1996, 1996, 1996, 01/16/2001     Rabavert 05/31/2018, 06/07/2018, 06/21/2018     TDAP Vaccine (Adacel) 04/10/2008, 03/15/2016     Varicella 05/16/1997, 04/10/2008     PREVENTATIVE HEALTH                                                      BMI: overweight  Blood pressure: within normal limits   Breast CA screening: not medically indicated at this time   Cervical CA screening: not medically indicated at this time   Colon CA screening: not medically indicated at this time   Lung CA screening: n/a   Dexa: not medically indicated at this time   Screening cholesterol: not medically indicated at this time   Screening diabetes: not medically indicated at this time   STD testing: not sexually active  Alcohol misuse screening: alcohol use reviewed - no intervention indicated at this time  Immunizations: reviewed; up to date     OBJECTIVE                                                      /76   Pulse 76   Temp 98.3  F (36.8  C) (Oral)   Ht 1.778 m (5' 10\")   Wt 79.6 kg (175 lb 8 oz)   LMP 04/05/2023 (Approximate)   SpO2 98%   Breastfeeding No   BMI 25.18 kg/m    Constitutional: well-appearing  Head, Ears, and Eyes: normocephalic; normal external auditory canal and pinna; tympanic membranes visualized and normal; normal lids and conjunctivae  Neck: supple, symmetric, no thyromegaly or lymphadenopathy  Respiratory: normal respiratory effort; clear to auscultation bilaterally  Cardiovascular: regular rate and rhythm; no " edema  Gastrointestinal: soft, non-tender, and non-distended; no organomegaly or masses  Musculoskeletal: normal gait and station  Right knee:    Inspection: AP/lateral alignment normal  Non-tender: throughout  Passive Range of Motion: all normal  Special tests: normal Valgus stress test, normal Varus, negative posterior drawer  Psych: normal judgment and insight; normal mood and affect; recent and remote memory intact  ---  (Note was completed, in part, with [employer] voice-recognition software. Documentation was reviewed, but some grammatical, spelling, and word errors may remain.)

## 2023-05-09 ENCOUNTER — ALLIED HEALTH/NURSE VISIT (OUTPATIENT)
Dept: ALLERGY | Facility: CLINIC | Age: 28
End: 2023-05-09
Payer: COMMERCIAL

## 2023-05-09 DIAGNOSIS — J30.9 ALLERGIC RHINITIS: ICD-10-CM

## 2023-05-09 DIAGNOSIS — J30.81 ALLERGIC RHINITIS DUE TO ANIMAL (CAT) (DOG) HAIR AND DANDER: Primary | ICD-10-CM

## 2023-05-09 PROCEDURE — 95120 IMMUNOTHERAPY ONE INJECTION: CPT

## 2023-05-15 ENCOUNTER — TELEPHONE (OUTPATIENT)
Dept: ALLERGY | Facility: CLINIC | Age: 28
End: 2023-05-15
Payer: COMMERCIAL

## 2023-05-15 NOTE — TELEPHONE ENCOUNTER
[facility] Call Center    Phone Message    May a detailed message be left on voicemail: no     Reason for Call: Other: PtCarmen needs to change Allergy Shot for 5/16.  Please call her at: [phone]     Action Taken: Other: CS Allergy    Travel Screening: Not Applicable

## 2023-05-15 NOTE — TELEPHONE ENCOUNTER
Called patient to reschedule allergy shot appointment for tomorrow 5/16. Patient's appointment was moved up an hour. No further action needed.     Dara Mercado, LALITHAN, RN

## 2023-05-16 ENCOUNTER — ALLIED HEALTH/NURSE VISIT (OUTPATIENT)
Dept: ALLERGY | Facility: CLINIC | Age: 28
End: 2023-05-16
Payer: COMMERCIAL

## 2023-05-16 DIAGNOSIS — J30.9 ALLERGIC RHINITIS: ICD-10-CM

## 2023-05-16 DIAGNOSIS — J30.81 ALLERGIC RHINITIS DUE TO ANIMAL (CAT) (DOG) HAIR AND DANDER: Primary | ICD-10-CM

## 2023-05-16 PROCEDURE — 95120 IMMUNOTHERAPY ONE INJECTION: CPT

## 2023-05-23 ENCOUNTER — ALLIED HEALTH/NURSE VISIT (OUTPATIENT)
Dept: ALLERGY | Facility: CLINIC | Age: 28
End: 2023-05-23
Payer: COMMERCIAL

## 2023-05-23 DIAGNOSIS — J30.9 ALLERGIC RHINITIS: ICD-10-CM

## 2023-05-23 DIAGNOSIS — J30.81 ALLERGIC RHINITIS DUE TO ANIMAL (CAT) (DOG) HAIR AND DANDER: Primary | ICD-10-CM

## 2023-05-23 PROCEDURE — 95120 IMMUNOTHERAPY ONE INJECTION: CPT

## 2023-06-01 ENCOUNTER — OFFICE VISIT (OUTPATIENT)
Dept: INTERNAL MEDICINE | Facility: CLINIC | Age: 28
End: 2023-06-01
Payer: COMMERCIAL

## 2023-06-01 VITALS
WEIGHT: 174.7 LBS | SYSTOLIC BLOOD PRESSURE: 98 MMHG | HEART RATE: 64 BPM | DIASTOLIC BLOOD PRESSURE: 68 MMHG | TEMPERATURE: 97.4 F | OXYGEN SATURATION: 99 % | BODY MASS INDEX: 25.01 KG/M2 | RESPIRATION RATE: 14 BRPM | HEIGHT: 70 IN

## 2023-06-01 DIAGNOSIS — L65.9 HAIR THINNING: Primary | ICD-10-CM

## 2023-06-01 DIAGNOSIS — R23.3 EASY BRUISING: ICD-10-CM

## 2023-06-01 LAB
ALBUMIN SERPL BCG-MCNC: 4 G/DL (ref 3.5–5.2)
ALP SERPL-CCNC: 46 U/L (ref 35–104)
ALT SERPL W P-5'-P-CCNC: 11 U/L (ref 10–35)
ANION GAP SERPL CALCULATED.3IONS-SCNC: 10 MMOL/L (ref 7–15)
APTT PPP: 23 SECONDS (ref 22–38)
AST SERPL W P-5'-P-CCNC: 20 U/L (ref 10–35)
BASOPHILS # BLD AUTO: 0 10E3/UL (ref 0–0.2)
BASOPHILS NFR BLD AUTO: 0 %
BILIRUB SERPL-MCNC: 0.5 MG/DL
BUN SERPL-MCNC: 20.5 MG/DL (ref 6–20)
CALCIUM SERPL-MCNC: 9.2 MG/DL (ref 8.6–10)
CHLORIDE SERPL-SCNC: 104 MMOL/L (ref 98–107)
CREAT SERPL-MCNC: 0.96 MG/DL (ref 0.51–0.95)
DEPRECATED HCO3 PLAS-SCNC: 25 MMOL/L (ref 22–29)
EOSINOPHIL # BLD AUTO: 0.3 10E3/UL (ref 0–0.7)
EOSINOPHIL NFR BLD AUTO: 5 %
ERYTHROCYTE [DISTWIDTH] IN BLOOD BY AUTOMATED COUNT: 12.3 % (ref 10–15)
FERRITIN SERPL-MCNC: 22 NG/ML (ref 6–175)
GFR SERPL CREATININE-BSD FRML MDRD: 83 ML/MIN/1.73M2
GLUCOSE SERPL-MCNC: 88 MG/DL (ref 70–99)
HCT VFR BLD AUTO: 44.1 % (ref 35–47)
HGB BLD-MCNC: 14.5 G/DL (ref 11.7–15.7)
IMM GRANULOCYTES # BLD: 0 10E3/UL
IMM GRANULOCYTES NFR BLD: 0 %
INR BLD: 1 (ref 0.9–1.1)
IRON BINDING CAPACITY (ROCHE): 371 UG/DL (ref 240–430)
IRON SATN MFR SERPL: 29 % (ref 15–46)
IRON SERPL-MCNC: 108 UG/DL (ref 37–145)
LYMPHOCYTES # BLD AUTO: 1.8 10E3/UL (ref 0.8–5.3)
LYMPHOCYTES NFR BLD AUTO: 27 %
MCH RBC QN AUTO: 28.9 PG (ref 26.5–33)
MCHC RBC AUTO-ENTMCNC: 32.9 G/DL (ref 31.5–36.5)
MCV RBC AUTO: 88 FL (ref 78–100)
MONOCYTES # BLD AUTO: 0.7 10E3/UL (ref 0–1.3)
MONOCYTES NFR BLD AUTO: 10 %
NEUTROPHILS # BLD AUTO: 4 10E3/UL (ref 1.6–8.3)
NEUTROPHILS NFR BLD AUTO: 59 %
PLATELET # BLD AUTO: 216 10E3/UL (ref 150–450)
POTASSIUM SERPL-SCNC: 4.7 MMOL/L (ref 3.4–5.3)
PROT SERPL-MCNC: 7.4 G/DL (ref 6.4–8.3)
RBC # BLD AUTO: 5.01 10E6/UL (ref 3.8–5.2)
SODIUM SERPL-SCNC: 139 MMOL/L (ref 136–145)
TSH SERPL DL<=0.005 MIU/L-ACNC: 0.97 UIU/ML (ref 0.3–4.2)
WBC # BLD AUTO: 6.8 10E3/UL (ref 4–11)

## 2023-06-01 PROCEDURE — 85610 PROTHROMBIN TIME: CPT | Performed by: INTERNAL MEDICINE

## 2023-06-01 PROCEDURE — 36416 COLLJ CAPILLARY BLOOD SPEC: CPT | Performed by: INTERNAL MEDICINE

## 2023-06-01 PROCEDURE — 83540 ASSAY OF IRON: CPT | Performed by: INTERNAL MEDICINE

## 2023-06-01 PROCEDURE — 82728 ASSAY OF FERRITIN: CPT | Performed by: INTERNAL MEDICINE

## 2023-06-01 PROCEDURE — 83550 IRON BINDING TEST: CPT | Performed by: INTERNAL MEDICINE

## 2023-06-01 PROCEDURE — 36415 COLL VENOUS BLD VENIPUNCTURE: CPT | Performed by: INTERNAL MEDICINE

## 2023-06-01 PROCEDURE — 85730 THROMBOPLASTIN TIME PARTIAL: CPT | Performed by: INTERNAL MEDICINE

## 2023-06-01 PROCEDURE — 80050 GENERAL HEALTH PANEL: CPT | Performed by: INTERNAL MEDICINE

## 2023-06-01 PROCEDURE — 99214 OFFICE O/P EST MOD 30 MIN: CPT | Performed by: INTERNAL MEDICINE

## 2023-06-01 NOTE — PROGRESS NOTES
"  ASSESSMENT/PLAN                                                      (L65.9) Hair thinning  (primary encounter diagnosis)  Plan: labs today to evaluate for potential reversible causes of hair thinning.    (R23.3) Easy bruising  Plan: CBC, INR, and PTT today.      Rosa Goodman MD   [facility]  [address] [place] [address]  T: [phone], F: [phone]    SUBJECTIVE                                                      Cristel Irving is a very pleasant 28 year old female who presents with hair thinning:    Ongoing for several months at least. Hair is thin throughout - no specific areas of hair thinning or loss.  Patient did chemically straighten her hair but that was only a week ago. No prior chemical treatments. No regular heat application or styling or tight styling. No recent COVID-19 diagnoses.    Unrelated to above, patient is noted to have multiple lower extremity bruises.  On further exam she has multiple upper extremity bruises as well. No known trauma. Patient was working on a particularly labor-intensive outdoor project recently but did not fall or drop items on herself. Patient denies abuse of any kind.    OBJECTIVE                                                      BP 98/68   Pulse 64   Temp 97.4  F (36.3  C) (Temporal)   Resp 14   Ht 1.778 m (5' 10\")   Wt 79.2 kg (174 lb 11.2 oz)   LMP 04/05/2023 (Approximate)   SpO2 99%   BMI 25.07 kg/m    Constitutional: well-appearing  Hair: Thin throughout; no recession at temples or crown; no areas without hair growth; positive pull test  Integumentary: multiple old appearing ecchymoses bilateral lower extremities > upper extremities              ---    (Note documentation was completed, in part, with [employer] voice-recognition software. Documentation was reviewed, but some grammatical, spelling, and word errors may remain.)    "

## 2023-06-05 ENCOUNTER — TELEPHONE (OUTPATIENT)
Dept: INTERNAL MEDICINE | Facility: CLINIC | Age: 28
End: 2023-06-05

## 2023-06-05 NOTE — TELEPHONE ENCOUNTER
"PA for levonorgestrel-ethinyl estrad (TORI) 90-20 MCG tablet     Pt sent a [employer] message:     \"I recently switched insurance providers and [facility] just sent me a letter stating that Tori Tab 90-20mcg isn't on their preferred drug list and requires prior authorization. Are you able to submit a request for prior authorization for this medication?\"        "

## 2023-06-08 ENCOUNTER — OFFICE VISIT (OUTPATIENT)
Dept: ORTHOPEDICS | Facility: CLINIC | Age: 28
End: 2023-06-08
Attending: INTERNAL MEDICINE
Payer: COMMERCIAL

## 2023-06-08 VITALS
WEIGHT: 179.4 LBS | HEIGHT: 70 IN | SYSTOLIC BLOOD PRESSURE: 114 MMHG | BODY MASS INDEX: 25.68 KG/M2 | DIASTOLIC BLOOD PRESSURE: 68 MMHG

## 2023-06-08 DIAGNOSIS — G89.29 CHRONIC PATELLOFEMORAL PAIN OF RIGHT KNEE: Primary | ICD-10-CM

## 2023-06-08 DIAGNOSIS — M25.561 CHRONIC PATELLOFEMORAL PAIN OF RIGHT KNEE: Primary | ICD-10-CM

## 2023-06-08 PROCEDURE — 99203 OFFICE O/P NEW LOW 30 MIN: CPT | Performed by: FAMILY MEDICINE

## 2023-06-08 NOTE — LETTER
6/8/2023         RE: Cristel Irving  [address]  [facility] [address]        Dear Colleague,    Thank you for referring your patient, Cristel Irving, to the [facility] [place]. Please see a copy of my visit note below.    CHIEF COMPLAINT:  Pain of the Right Knee       HISTORY OF PRESENT ILLNESS  Ms. Irving is a pleasant 28 year old female who presents to clinic today with right knee pain.  Cristel explains that she's had intermittent knee pain for a few years with no known injury or trauma. The pain occurs with stairs, lunges and squats. The pain lingers for a few days following these activities but improves with rest.     Onset: gradual  Location: right knee  Quality:  dull  Duration: 2-3 years   Severity: 6/10 at worst  Timing:intermittent episodes with exercise  Modifying factors:  resting and non-use makes it better, movement and use makes it worse  Associated signs & symptoms: pain with stairs, lunges and squats  Previous similar pain: No  Treatments to date: rest, activity avoidance, XR 5/5/23    Additional history: as documented    Review of Systems:    Have you recently had a a fever, chills, weight loss? No    Do you have any vision problems? No    Do you have any chest pain or edema? No    Do you have any shortness of breath or wheezing?  No    Do you have stomach problems? No    Do you have any numbness or focal weakness? No    Do you have diabetes? No    Do you have problems with bleeding or clotting? No    Do you have an rashes or other skin lesions? No    MEDICAL HISTORY  Patient Active Problem List   Diagnosis     JESSIE (generalized anxiety disorder)     MDD (major depressive disorder)       Current Outpatient Medications   Medication Sig Dispense Refill     albuterol (PROAIR HFA/PROVENTIL HFA/VENTOLIN HFA) 108 (90 Base) MCG/ACT inhaler Inhale 2 puffs into the lungs every 4 hours as needed for shortness of breath, wheezing or cough 18 g 1      "busPIRone (BUSPAR) 10 MG tablet Take 1 tablet (10 mg) by mouth daily 90 tablet 3     levonorgestrel-ethinyl estrad (TORI) 90-20 MCG tablet Take 1 tablet by mouth daily 112 tablet 3     ORDER FOR ALLERGEN IMMUNOTHERAPY Name of Mix: Mix #1  Cat, Dog  Cat Hair, Standardized A.P. 10,000 BAU/mL, HS  2.0 ml  Dog Hair-Dander, A. P.  1:100 w/v, HS  1.0 ml   Diluent: HSA 2 ml to 5ml 5 mL PRN     sertraline (ZOLOFT) 100 MG tablet Take 1 tablet (100 mg) by mouth daily 90 tablet 3       No Known Allergies    Family History   Problem Relation Age of Onset     Anxiety Disorder Mother      Depression Father      Depression Paternal Grandmother      Diabetes No family hx of      Cerebrovascular Disease No family hx of      Coronary Artery Disease Early Onset No family hx of      Breast Cancer No family hx of      Ovarian Cancer No family hx of      Colon Cancer No family hx of        Additional medical/Social/Surgical histories reviewed in [facility] and updated as appropriate.       PHYSICAL EXAM  Ht 1.778 m (5' 10\")   Wt 81.4 kg (179 lb 6.4 oz)   LMP 04/05/2023 (Approximate)   BMI 25.74 kg/m      General  - normal appearance, in no obvious distress  Musculoskeletal - Right knee  - stance: normal gait without limp  - inspection: no swelling or effusion. Painful single leg squat, medial knee thrust and contralateral hip drop mild with SL squat.  - palpation: no joint line tenderness, patellar tendon non-tender, tender medial patellar facet  - ROM: 135 degrees flexion, -5 degrees extension, not painful, crepitus with weight-bearing flexion  - strength: 5/5 in flexion, 5/5 in extension  - special tests:  (-) Lachman  (-) anterior drawer  (-) Mirian  (-) Thessaly  (-) varus at 0 and 30 degrees flexion  (-) valgus at 0 and 30 degrees flexion  (+) patellar compression  (+) patellar grind  (-) patellar apprehension  Neuro  - no sensory or motor deficit, grossly normal coordination, normal muscle tone    IMAGING : X-ray right knee 3 " view- final results and radiologist's interpretation, available in the [facility] health record. Images were reviewed with the patient/family members in the office today. My personal interpretation of the performed imaging is no acute osseous abnormalities.  No degenerative changes.     ASSESSMENT & PLAN  Ms. Irving is a 28 year old year old female who presents to clinic today with chronic anterior knee pain with specific flexion-based activities such as squatting, lunging or stairs.    Reassuring x-ray as well as physical exam findings consistent with patellar maltracking/suspected chondromalacia.    Diagnosis:   Chronic patellofemoral pain of right knee    Treatment options discussed to improve biomechanics and knee tracking.  I would like her to start a home exercise program to incorporate hip as well as knee strengthening.  Physical therapy order was also placed given chronicity of this issue and her high level of activity.  Superfeet orthotic inserts recommended to stabilize her foot and ankle below her knee joint.  Consideration in physical therapy for patellar taping techniques.  Activity modification as able.    Follow-up in 6 to 8 weeks.  If no improvement we can discussed consideration for MRI, reengage discussion about formal physical therapy, and consideration of injection options.    It was a pleasure seeing Cristel today.    Trevor Cordoba DO, [facility]  Primary Care Sports Medicine      Again, thank you for allowing me to participate in the care of your patient.        Sincerely,        Trevor Cordoba DO

## 2023-06-08 NOTE — PATIENT INSTRUCTIONS
Thank you for choosing [facility]    DR HO'S CLINIC LOCATIONS  [place] [address] [facility], 4th [place]   [place], [place], [address] [place] [address]   [phone]       APPOINTMENTS: [phone]    CARE QUESTIONS: [phone],    BILLING QUESTIONS: [phone]    FAX NUMBER: [phone]        Follow up: as needed      1. Chronic patellofemoral pain of right knee    2. Chronic pain of right knee        Physical Therapy orders have been placed with [facility] (formally [facility])  You can call [phone] to schedule at your convenience.       Chondromalacia / Patellofemoral Pain    CHONDROMALACIA PATELLAE:  -Pain in the front of your knee due to increased pressure from the knee cap (patella)  -There are many causes including trauma, history of dislocation or subluxation of knee cap but most often it is due to an imbalance of the thigh muscles or weak core muscles which cause irregular tracking of your kneecap on your thigh bone.  -People whose feet pronate (roll in) increase the outward pulling on the kneecap as well    SIGNS AND SYMPTOMS:  -Diffuse knee pain that worsens with stairs, squatting, prolonged sitting, jumping, and similar movements.  -Pain may be achy or sharp  -Stiffness with prolonged sitting  -Occasionally, giving way of the knee, grinding or a catching sensation    TREATMENT:  -regular exercise (biking, swimming, or elliptical are good for cardio)  -weight lifting/plyometrics are helpful but remember to keep your knees behind your toes (don't bend knee more than 90degrees)  -regular core exercises (yoga and pilates)  -arch supports may help during exercise until hips stronger to prevent ankle pronation  *over the counter semi-rigid brands include power step arch supports may be purchased at [employer], [employer] or  internet  *custom made orthotics may be ordered by your physician if needed for prolonged treatment  -Stretching and strengthening therapy  -Ice 10-15 minutes after activity. (Ice cups for massage 5-7min)  -Ice and NSAIDs help decrease inflammation. A good anti-inflammatory NSAID medication is Alleve (220mg). Take 1-2 tabs twice daily with food until pain improves or minimum of 14 days, then as needed for pain. (1-2 tabs twice daily dosing is for patients over 12 years old. Brigida than 11 yo, check dose with doctor.)  -often component of hip weakness that leads to lower extremity (foot, ankle, shin, and knee) problems so a lot of focus will be on core strength and balance  - recommend yoga for core strengthening and stretching  -Perform exercises as instructed through handout or formal therapy if doing. Until then start with the following:  -Ankle strengthening  1) balance on one foot 1-2 min daily  2) once able to balance for 1 minute, start hip strengthening with 4 way motion with straight leg. Tie theraband around ankle and balance on other foot while doing15 reps each direction of straight leg  motion  3) arch raises- tighten bottom of foot and hold x 3-5 sec, repeat 5 times  4) ankle exercises (4 way with theraband)- 3 sets of 10-15 (fatigue) daily  -usually takes several weeks before pain free but longer before return to full activity without pain  --Once released to increase activity, BE PATIENT!    Return to activity guidelines include:  If it hurts, please avoid activity  Start gradually and build up, wait 24 hours before increase intensity and time  Runnin min on treadmill (or somewhere you can get home easily from if you have to stop), start walk 4 min/run 1 min and Repeat 3 times. If pain free for 48 hours, increase to walk 3 min/run 2 min. Continue to increase as such as pain allows. If you develop pain, back off to previous pain-free level and wait 1 week before increasing again.  Follow-up in 6  weeks if not improved or sooner if further concern.  If problem flares again after resolved, restart icing, and exercises.      Standing hamstring stretch: Put the heel of the leg on your injured side on a stool about 15 inches high. Keep your leg straight. Lean forward, bending at the hips, until you feel a mild stretch in the back of your thigh. Make sure you don't roll your shoulders or bend at the waist when doing this or you will stretch your lower back instead of your leg. Hold the stretch for 15 to 30 seconds. Repeat 3 times.    Quadriceps stretch: Stand at an arm's length away from the wall with your injured side farthest from the wall. Facing straight ahead, brace yourself by keeping one hand against the wall. With your other hand, grasp the ankle on your injured side and pull your heel toward your buttocks. Don't arch or twist your back. Keep your knees together. Hold this stretch for 15 to 30 seconds.    Side-lying leg lift: Lie on your uninjured side. Tighten the front thigh muscles on your injured leg and lift that leg 8 to 10 inches (20 to 25 centimeters) away from the other leg. Keep the leg straight and lower it slowly. Do 2 sets of 15.    Quad sets: Sit on the floor with your injured leg straight and your other leg bent. Press the back of the knee of your injured leg against the floor by tightening the muscles on the top of your thigh. Hold this position 10 seconds. Relax. Do 2 sets of 15.  Straight leg raise: Lie on your back with your legs straight out in front of you. Bend the knee on your uninjured side and place the foot flat on the floor. Tighten the thigh muscle on your injured side and lift your leg about 8 inches off the floor. Keep your leg straight and your thigh muscle tight. Slowly lower your leg back down to the floor. Do 2 sets of 15.    Clam exercise: Lie on your uninjured side with your hips and knees bent and feet together. Slowly raise your top leg toward the ceiling while keeping  your heels touching each other. Hold for 2 seconds and lower slowly. Do 2 sets of 15 repetitions.    Wall squat with a ball: Stand with your back, shoulders, and head against a wall. Look straight ahead. Keep your shoulders relaxed and your feet 3 feet (90 centimeters) from the wall and shoulder's width apart. Place a soccer or basketball-sized ball behind your back. Keeping your back against the wall, slowly squat down to a 45-degree angle. Your thighs will not yet be parallel to the floor. Hold this position for 10 seconds and then slowly slide back up the wall. Repeat 10 times. Build up to 2 sets of 15.    Knee stabilization: Wrap a piece of elastic tubing around the ankle of your uninjured leg. Tie a knot in the other end of the tubing and close it in a door at about ankle height.  Stand facing the door on the leg without tubing (your injured leg) and bend your knee slightly, keeping your thigh muscles tight. Stay in this position while you move the leg with the tubing (the uninjured leg) straight back behind you. Do 2 sets of 15.  Turn 90 degrees so the leg without tubing is closest to the door. Move the leg with tubing away from your body. Do 2 sets of 15.  Turn 90 degrees again so your back is to the door. Move the leg with tubing straight out in front of you. Do 2 sets of 15.  Turn your body 90 degrees again so the leg with tubing is closest to the door. Move the leg with tubing across your body. Do 2 sets of 15.  Hold onto a chair if you need help balancing. This exercise can be made more challenging by standing on a firm pillow or foam mat while you move the leg with tubing.    Resisted terminal knee extension: Make a loop with a piece of elastic tubing by tying a knot in both ends. Close the knot in a door at knee height. Step into the loop with your injured leg so the tubing is around the back of your knee. Lift the other foot off the ground and hold onto a chair for balance, if needed. Bend the knee  with tubing about 45 degrees. Slowly straighten your leg, keeping your thigh muscle tight as you do this. Repeat 15 times. Do 2 sets of 15. If you need an easier way to do this, stand on both legs for better support while you do the exercise.    Standing calf stretch: Stand facing a wall with your hands on the wall at about eye level. Keep your injured leg back with your heel on the floor. Keep the other leg forward with the knee bent. Turn your back foot slightly inward (as if you were pigeon-toed). Slowly lean into the wall until you feel a stretch in the back of your calf. Hold the stretch for 15 to 30 seconds. Return to the starting position. Repeat 3 times. Do this exercise several times each day.    Step-up: Stand with the foot of your injured leg on a support 3 to 5 inches (8 to 13 centimeters) high --like a small step or block of wood. Keep your other foot flat on the floor. Shift your weight onto the injured leg on the support. Straighten your injured leg as the other leg comes off the floor. Return to the starting position by bending your injured leg and slowly lowering your uninjured leg back to the floor. Do 2 sets of 15.    Iliotibial band stretch, side-bending: Cross one leg in front of the other leg and lean in the opposite direction from the front leg. Reach the arm on the side of the back leg over your head while you do this. Hold this position for 15 to 30 seconds. Return to the starting position. Repeat 3 times and then switch legs and repeat the exercise.  Developed by [employer].  Published by [employer].  Copyright  2014 [employer] and/or one of its subsidiaries. All rights reserved.             Superfeet orthotic inserts - Blue   *[employer], [employer]

## 2023-06-10 NOTE — TELEPHONE ENCOUNTER
PA Initiation    Medication: TORI 90-20 MCG PO TABS  Insurance Company: [employer] ([facility]) - Phone [phone] Fax [phone]  Pharmacy Filling the Rx: [id number] - [facility] [address] [facility]  Filling Pharmacy Phone: [phone]  Filling Pharmacy Fax:    Start Date: 6/10/2023

## 2023-06-13 ENCOUNTER — ALLIED HEALTH/NURSE VISIT (OUTPATIENT)
Dept: ALLERGY | Facility: CLINIC | Age: 28
End: 2023-06-13
Payer: COMMERCIAL

## 2023-06-13 DIAGNOSIS — Z30.011 BCP (BIRTH CONTROL PILLS) INITIATION: ICD-10-CM

## 2023-06-13 DIAGNOSIS — J30.9 ALLERGIC RHINITIS: ICD-10-CM

## 2023-06-13 DIAGNOSIS — J30.81 ALLERGIC RHINITIS DUE TO ANIMAL (CAT) (DOG) HAIR AND DANDER: Primary | ICD-10-CM

## 2023-06-13 PROCEDURE — 95115 IMMUNOTHERAPY ONE INJECTION: CPT

## 2023-06-13 PROCEDURE — 99207 PR DROP WITH A PROCEDURE: CPT

## 2023-06-13 RX ORDER — LEVONORGESTREL AND ETHINYL ESTRADIOL 90; 20 UG/1; UG/1
1 TABLET ORAL DAILY
Qty: 112 TABLET | Refills: 3 | Status: CANCELLED | OUTPATIENT
Start: 2023-06-13

## 2023-06-13 NOTE — TELEPHONE ENCOUNTER
Prior Authorization Approval    Medication: TORI 90-20 MCG PO TABS  Authorization Effective Date: 6/10/2023  Authorization Expiration Date: 6/10/2024  Approved Dose/Quantity: 112/84  Reference #: [id number]   Insurance Company: [employer] ([facility]) - Phone [phone] Fax [phone]  Expected CoPay:       CoPay Card Available:      Financial Assistance Needed:   Which Pharmacy is filling the prescription: [id number] - [facility] [address] [facility]  Pharmacy Notified: Yes  Patient Notified: Yes

## 2023-06-22 ENCOUNTER — THERAPY VISIT (OUTPATIENT)
Dept: PHYSICAL THERAPY | Facility: CLINIC | Age: 28
End: 2023-06-22
Attending: FAMILY MEDICINE
Payer: COMMERCIAL

## 2023-06-22 DIAGNOSIS — M25.561 CHRONIC PATELLOFEMORAL PAIN OF RIGHT KNEE: ICD-10-CM

## 2023-06-22 DIAGNOSIS — G89.29 CHRONIC PATELLOFEMORAL PAIN OF RIGHT KNEE: ICD-10-CM

## 2023-06-22 PROCEDURE — 97110 THERAPEUTIC EXERCISES: CPT | Mod: GP | Performed by: PHYSICAL THERAPIST

## 2023-06-22 PROCEDURE — 97161 PT EVAL LOW COMPLEX 20 MIN: CPT | Mod: GP | Performed by: PHYSICAL THERAPIST

## 2023-06-22 ASSESSMENT — ACTIVITIES OF DAILY LIVING (ADL)
LIMPING: I DO NOT HAVE THE SYMPTOM
WALK: ACTIVITY IS NOT DIFFICULT
AS_A_RESULT_OF_YOUR_KNEE_INJURY,_HOW_WOULD_YOU_RATE_YOUR_CURRENT_LEVEL_OF_DAILY_ACTIVITY?: NORMAL
RISE FROM A CHAIR: ACTIVITY IS NOT DIFFICULT
KNEEL ON THE FRONT OF YOUR KNEE: ACTIVITY IS NOT DIFFICULT
WEAKNESS: I DO NOT HAVE THE SYMPTOM
SIT WITH YOUR KNEE BENT: ACTIVITY IS NOT DIFFICULT
RAW_SCORE: 65
STAND: ACTIVITY IS NOT DIFFICULT
SWELLING: I DO NOT HAVE THE SYMPTOM
GO DOWN STAIRS: ACTIVITY IS MINIMALLY DIFFICULT
GIVING WAY, BUCKLING OR SHIFTING OF KNEE: I DO NOT HAVE THE SYMPTOM
PAIN: THE SYMPTOM AFFECTS MY ACTIVITY SLIGHTLY
HOW_WOULD_YOU_RATE_THE_CURRENT_FUNCTION_OF_YOUR_KNEE_DURING_YOUR_USUAL_DAILY_ACTIVITIES_ON_A_SCALE_FROM_0_TO_100_WITH_100_BEING_YOUR_LEVEL_OF_KNEE_FUNCTION_PRIOR_TO_YOUR_INJURY_AND_0_BEING_THE_INABILITY_TO_PERFORM_ANY_OF_YOUR_USUAL_DAILY_ACTIVITIES?: 100
HOW_WOULD_YOU_RATE_THE_OVERALL_FUNCTION_OF_YOUR_KNEE_DURING_YOUR_USUAL_DAILY_ACTIVITIES?: NORMAL
GO UP STAIRS: ACTIVITY IS MINIMALLY DIFFICULT
KNEE_ACTIVITY_OF_DAILY_LIVING_SUM: 65
SQUAT: ACTIVITY IS MINIMALLY DIFFICULT
KNEE_ACTIVITY_OF_DAILY_LIVING_SCORE: 92.86
STIFFNESS: I DO NOT HAVE THE SYMPTOM

## 2023-06-22 NOTE — PROGRESS NOTES
PHYSICAL THERAPY EVALUATION  Type of Visit: Evaluation    See electronic medical record for Abuse and Falls Screening details.    Subjective      Presenting condition or subjective complaint: Pt presents with complaints of intermittent R knee pain. Pt reported onset of sx's approximately 2 years ago. Pt reported knee pain occurs primarily with specific movements/activities: squatting, stairs, lunges. Pt reported she attends an exercise class 4x/week which involves squatting and lunges. Pt reports knee pain is affected with these activities but not always to the same extent. Pt reported 2 flights of stairs within her home; aware of discomfort with ascending stairs but does not affect ability to use the stairs. Pt reports she is able to kneel without discomfort.  Date of onset: 6-8-23    Relevant medical history: Asthma; Depression   Dates & types of surgery:      Prior diagnostic imaging/testing results: X-ray     Prior therapy history for the same diagnosis, illness or injury: Yes 2016?    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       Objective   KNEE EVALUATION  PAIN: Pain Level at Rest: 0/10  Pain Level with Use: 3/10  Pain Location: retropatellar  Pain is Exacerbated By: stairs, squatting, lunges  INTEGUMENTARY (edema, incisions): WNL  POSTURE: WNL  GAIT:  Weightbearing Status: Full weight bearing  Assistive Device(s): None  Gait Deviations: WNL    ROM: Knee flexion: 147 degrees; passive knee extension: 7 degrees. PROM R hip: WNL's.    STRENGTH:   Pain: - none + mild ++ moderate +++  severe  Strength Scale: 0-5/5 Left Right   Knee Flexion 5 5   Knee Extension 5 5   Quad Set 5 5     Hip strength: ER: 5/5; ABD: 5/5; Hip extension: 5-/5.  FLEXIBILITY: WNL  SPECIAL TESTS: negative patellar grind  FUNCTIONAL TESTS: Double Leg Squat: Good technique/no significant findings  PALPATION: WNL  JOINT MOBILITY: Normal patellar movement without pain provocation.    Assessment & Plan   CLINICAL IMPRESSIONS   Medical Diagnosis: Chronic patellofemoral pain of right knee    Treatment Diagnosis: chronic paterallofemoral knee pain   Impression/Assessment: Patient is a 28 year old female with R chronic, intermittent knee complaints.  The following significant findings have been identified: Pain, Decreased strength, Impaired muscle performance and Decreased activity tolerance. These impairments interfere with their ability to perform recreational activities as compared to previous level of function.     Clinical Decision Making (Complexity):   Clinical Presentation: Stable/Uncomplicated  Clinical Presentation Rationale: based on medical and personal factors listed in PT evaluation  Clinical Decision Making (Complexity): Low complexity    PLAN OF CARE  Treatment Interventions:  Interventions: Neuromuscular Re-education, Therapeutic Activity, Therapeutic Exercise, Self-Care/Home Management, patellar taping    Long Term Goals     PT Goal 1  Goal Identifier: Stairs  Goal Description: Ascend/descend stairs with pain 0/10  Rationale: to maximize safety and independence within the community;to maximize safety and independence within the home  Target Date: 08/17/23      Frequency of Treatment: 1x/week for 4 weeks, tapering to every other week for 4 weeks  Duration of Treatment: 8 weeks    Recommended Referrals to Other Professionals:   Education Assessment:        Risks and benefits of evaluation/treatment have been explained.   Patient/Family/caregiver agrees with Plan of Care.     Evaluation Time:     PT Eval, Low  Complexity Minutes (67117): 25  Signing Clinician: Nata Ritchie PT      [facility]                                                                                   OUTPATIENT PHYSICAL THERAPY      PLAN OF TREATMENT FOR OUTPATIENT REHABILITATION   Patient's Last Name, First Name, Cristel Nieto  FRITZ YOB: 1995   Provider's Name   [facility]   Medical Record No.  [id number]     Onset Date:  (6/2021)  Start of Care Date: 06/22/23     Medical Diagnosis:  Chronic patellofemoral pain of right knee      PT Treatment Diagnosis:  chronic paterallofemoral knee pain Plan of Treatment  Frequency/Duration: 1x/week for 4 weeks, tapering to every other week for 4 weeks/ 8 weeks    Certification date from 06/22/23 to 08/17/23         See note for plan of treatment details and functional goals     Nata Ritchie PT                         I CERTIFY THE NEED FOR THESE SERVICES FURNISHED UNDER        THIS PLAN OF TREATMENT AND WHILE UNDER MY CARE     (Physician attestation of this document indicates review and certification of the therapy plan).                  Referring Provider:  Trevor Cordoba      Initial Assessment  See Epic Evaluation- Start of Care Date: 06/22/23

## 2023-07-10 ENCOUNTER — THERAPY VISIT (OUTPATIENT)
Dept: PHYSICAL THERAPY | Facility: CLINIC | Age: 28
End: 2023-07-10
Payer: COMMERCIAL

## 2023-07-10 DIAGNOSIS — G89.29 CHRONIC PATELLOFEMORAL PAIN OF RIGHT KNEE: Primary | ICD-10-CM

## 2023-07-10 DIAGNOSIS — M25.561 CHRONIC PATELLOFEMORAL PAIN OF RIGHT KNEE: Primary | ICD-10-CM

## 2023-07-10 PROCEDURE — 97110 THERAPEUTIC EXERCISES: CPT | Mod: GP | Performed by: PHYSICAL THERAPIST

## 2023-07-10 PROCEDURE — 97530 THERAPEUTIC ACTIVITIES: CPT | Mod: GP | Performed by: PHYSICAL THERAPIST

## 2023-07-11 ENCOUNTER — OFFICE VISIT (OUTPATIENT)
Dept: ALLERGY | Facility: CLINIC | Age: 28
End: 2023-07-11
Payer: COMMERCIAL

## 2023-07-11 ENCOUNTER — ALLIED HEALTH/NURSE VISIT (OUTPATIENT)
Dept: ALLERGY | Facility: CLINIC | Age: 28
End: 2023-07-11
Payer: COMMERCIAL

## 2023-07-11 VITALS
WEIGHT: 173 LBS | RESPIRATION RATE: 16 BRPM | DIASTOLIC BLOOD PRESSURE: 64 MMHG | BODY MASS INDEX: 24.82 KG/M2 | HEART RATE: 61 BPM | OXYGEN SATURATION: 98 % | SYSTOLIC BLOOD PRESSURE: 99 MMHG

## 2023-07-11 DIAGNOSIS — J30.81 ALLERGIC RHINITIS DUE TO ANIMAL (CAT) (DOG) HAIR AND DANDER: Primary | ICD-10-CM

## 2023-07-11 DIAGNOSIS — R06.2 WHEEZING: Primary | ICD-10-CM

## 2023-07-11 DIAGNOSIS — J30.9 ALLERGIC RHINITIS: ICD-10-CM

## 2023-07-11 PROCEDURE — 99214 OFFICE O/P EST MOD 30 MIN: CPT | Mod: 25 | Performed by: INTERNAL MEDICINE

## 2023-07-11 PROCEDURE — 95120 IMMUNOTHERAPY ONE INJECTION: CPT

## 2023-07-11 NOTE — PROGRESS NOTES
Cristel Irving was seen in the Allergy Clinic at [facility].    Cristel Irving is a 28 year old female being seen today for ongoing evaluation of allergic rhinitis who is on allergy shots.    Since the last visit the patient has been mildly improved.    She was last seen February 27.  She had been on allergy shots for approximately 1 year.  She is receiving allergy shots to dogs and cats.  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    At the last appointment she did not feel allergy shots were helping.  Today she does recognize that she has had some improvement.  She used to have symptoms with all cat exposure which was mild to severe.  Currently there are cats that cause no symptoms and the worst that she will develop in regards to symptom severity is moderate.  So there has been some improvement and she would like to continue with the allergy shots.  She does take an Zyrtec prior to her allergy shot but not on a daily basis.      She was prescribed albuterol at the last appointment for exercise-induced wheezing and shortness of breath.  She does find this to be beneficial however it only provides benefit for 30 minutes.  At that point she does develop wheezing and shortness of breath.      Past Medical History:   Diagnosis Date     JESSIE (generalized anxiety disorder)      MDD (major depressive disorder)      Family History   Problem Relation Age of Onset     Anxiety Disorder Mother      Depression Father      Depression Paternal Grandmother      Diabetes No family hx of      Cerebrovascular Disease No family hx of      Coronary Artery Disease Early Onset No family hx of      Breast Cancer No family hx of      Ovarian Cancer No family hx of      Colon Cancer No family hx of      Past Surgical History:   Procedure Laterality Date     RHINOPLASTY  2022         Current Outpatient Medications:      albuterol (PROAIR  HFA/PROVENTIL HFA/VENTOLIN HFA) 108 (90 Base) MCG/ACT inhaler, Inhale 2 puffs into the lungs every 4 hours as needed for shortness of breath, wheezing or cough, Disp: 18 g, Rfl: 1     busPIRone (BUSPAR) 10 MG tablet, Take 1 tablet (10 mg) by mouth daily, Disp: 90 tablet, Rfl: 3     levonorgestrel-ethinyl estrad (TORI) 90-20 MCG tablet, Take 1 tablet by mouth daily, Disp: 112 tablet, Rfl: 3     sertraline (ZOLOFT) 100 MG tablet, Take 1 tablet (100 mg) by mouth daily, Disp: 90 tablet, Rfl: 3     ORDER FOR ALLERGEN IMMUNOTHERAPY, Name of Mix: Mix #1  Cat, Dog Cat Hair, Standardized A.P. 10,000 BAU/mL, HS  2.0 ml Dog Hair-Dander, A. P.  1:100 w/v, HS  1.0 ml  Diluent: HSA 2 ml to 5ml, Disp: 5 mL, Rfl: PRN  No Known Allergies      EXAM:   Resp 16   Wt 78.5 kg (173 lb)   BMI 24.82 kg/m      Constitutional:       General: She is not in acute distress.     Appearance: Normal appearance. She is not ill-appearing.   HENT:      Head: Normocephalic and atraumatic.      Nose: Nose normal. No congestion or rhinorrhea.      Mouth/Throat:      Mouth: Mucous membranes are moist.      Pharynx: Oropharynx is clear. No posterior oropharyngeal erythema.   Eyes:      General:         Right eye: No discharge.         Left eye: No discharge.   Cardiovascular:      Rate and Rhythm: Normal rate and regular rhythm.      Heart sounds: Normal heart sounds.   Pulmonary:      Effort: Pulmonary effort is normal.      Breath sounds: Normal breath sounds. No wheezing or rhonchi.   Skin:     General: Skin is warm.      Findings: No erythema or rash.   Neurological:      General: No focal deficit present.      Mental Status: She is alert. Mental status is at baseline.   Psychiatric:         Mood and Affect: Mood normal.         Behavior: Behavior normal.       ASSESSMENT/PLAN:  Cristel Irving is a 28 year old female seen today for ongoing evaluation of allergic rhinitis.  She is allergic to dogs and cats and is receiving allergy shots to  both.    She is not having any reactions to the allergy shots.  She does take Zyrtec prior to allergy shots.    She does have some wheezing and cough with exertion while exercising.    She does get benefit with the albuterol however not complete benefit.  She will start by using a spacer with the albuterol 2 puffs 15 to 30 minutes prior to exercise.  If the spacer does not provide benefit we will switch to Combivent which will be 15 minutes prior to exercise, 1 puff.    1. Try Albuterol 2 puffs with a spacer 15 minutes before exercise  2. If not effective try Combivent, 1 puff 15 minute prior to exercise.   3. Will continue allergy shots.  4. Does have an EpiPen to use in case she has allergic reaction after her allergy shots.  5. Allergy shot received today.    Follow-up in 6 months      Thank you for allowing me to participate in the care of Cristel Irving.      I spent 30 minutes on the date of the encounter doing chart review, history and exam, documentation and further coordination as noted above exclusive of separately reported interpretations    Cezar Crews MD  Allergy/Immunology  [facility]

## 2023-07-11 NOTE — PATIENT INSTRUCTIONS
Try Albuterol 2 puffs with a spacer 15 minutes before exercise  If not effective try Combivent, 1 puff 15 minute prior to exercise.   Will continue allergy shots.      Allergy Staff Appt Hours Shot Hours Location       Physician   Cezar Crews MD      Support Staff   Dara FRAUSTO, AMBREEN ODELL, RN   Tyree SENIOR, MA   Alejandro CHOWDHURY, BEBETO      Mondays Tuesdays Thursdays and Fridays:  Ana Paula 7-5 Wednesdays  Close                Mondays, Tuesdays and Fridays:  7:20 - 3:40              [facility]  [address]  [place] [address]  Appt Line: [phone]    Pulmonary Function Scheduling:  [place]: [phone]

## 2023-07-17 ENCOUNTER — THERAPY VISIT (OUTPATIENT)
Dept: PHYSICAL THERAPY | Facility: CLINIC | Age: 28
End: 2023-07-17
Payer: COMMERCIAL

## 2023-07-17 DIAGNOSIS — G89.29 CHRONIC PATELLOFEMORAL PAIN OF RIGHT KNEE: Primary | ICD-10-CM

## 2023-07-17 DIAGNOSIS — M25.561 CHRONIC PATELLOFEMORAL PAIN OF RIGHT KNEE: Primary | ICD-10-CM

## 2023-07-17 PROCEDURE — 97530 THERAPEUTIC ACTIVITIES: CPT | Mod: GP | Performed by: PHYSICAL THERAPIST

## 2023-07-17 PROCEDURE — 97110 THERAPEUTIC EXERCISES: CPT | Mod: GP | Performed by: PHYSICAL THERAPIST

## 2023-07-25 ENCOUNTER — TELEPHONE (OUTPATIENT)
Dept: ALLERGY | Facility: CLINIC | Age: 28
End: 2023-07-25
Payer: COMMERCIAL

## 2023-07-25 NOTE — TELEPHONE ENCOUNTER
[facility] Call Center    Phone Message    May a detailed message be left on voicemail: yes     Reason for Call: Other: Please call Pt to schedule an allergy shot. Thank you.     Action Taken: Message routed to:  Other: [facility] Allergy    Travel Screening: Not Applicable

## 2023-07-31 ENCOUNTER — THERAPY VISIT (OUTPATIENT)
Dept: PHYSICAL THERAPY | Facility: CLINIC | Age: 28
End: 2023-07-31
Payer: COMMERCIAL

## 2023-07-31 DIAGNOSIS — M25.561 CHRONIC PATELLOFEMORAL PAIN OF RIGHT KNEE: Primary | ICD-10-CM

## 2023-07-31 DIAGNOSIS — G89.29 CHRONIC PATELLOFEMORAL PAIN OF RIGHT KNEE: Primary | ICD-10-CM

## 2023-07-31 PROCEDURE — 97110 THERAPEUTIC EXERCISES: CPT | Mod: GP | Performed by: PHYSICAL THERAPIST

## 2023-07-31 PROCEDURE — 97530 THERAPEUTIC ACTIVITIES: CPT | Mod: GP | Performed by: PHYSICAL THERAPIST

## 2023-07-31 ASSESSMENT — ACTIVITIES OF DAILY LIVING (ADL)
KNEEL ON THE FRONT OF YOUR KNEE: ACTIVITY IS NOT DIFFICULT
LIMPING: I DO NOT HAVE THE SYMPTOM
KNEE_ACTIVITY_OF_DAILY_LIVING_SUM: 66
SIT WITH YOUR KNEE BENT: ACTIVITY IS NOT DIFFICULT
WEAKNESS: I DO NOT HAVE THE SYMPTOM
RAW_SCORE: 66
GO DOWN STAIRS: ACTIVITY IS NOT DIFFICULT
HOW_WOULD_YOU_RATE_THE_OVERALL_FUNCTION_OF_YOUR_KNEE_DURING_YOUR_USUAL_DAILY_ACTIVITIES?: NORMAL
WALK: ACTIVITY IS NOT DIFFICULT
AS_A_RESULT_OF_YOUR_KNEE_INJURY,_HOW_WOULD_YOU_RATE_YOUR_CURRENT_LEVEL_OF_DAILY_ACTIVITY?: NORMAL
STIFFNESS: I DO NOT HAVE THE SYMPTOM
SWELLING: I DO NOT HAVE THE SYMPTOM
PAIN: I HAVE THE SYMPTOM BUT IT DOES NOT AFFECT MY ACTIVITY
RISE FROM A CHAIR: ACTIVITY IS MINIMALLY DIFFICULT
HOW_WOULD_YOU_RATE_THE_CURRENT_FUNCTION_OF_YOUR_KNEE_DURING_YOUR_USUAL_DAILY_ACTIVITIES_ON_A_SCALE_FROM_0_TO_100_WITH_100_BEING_YOUR_LEVEL_OF_KNEE_FUNCTION_PRIOR_TO_YOUR_INJURY_AND_0_BEING_THE_INABILITY_TO_PERFORM_ANY_OF_YOUR_USUAL_DAILY_ACTIVITIES?: 100
STAND: ACTIVITY IS NOT DIFFICULT
SQUAT: ACTIVITY IS MINIMALLY DIFFICULT
GIVING WAY, BUCKLING OR SHIFTING OF KNEE: I DO NOT HAVE THE SYMPTOM
KNEE_ACTIVITY_OF_DAILY_LIVING_SCORE: 94.29
GO UP STAIRS: ACTIVITY IS MINIMALLY DIFFICULT

## 2023-08-08 ENCOUNTER — ALLIED HEALTH/NURSE VISIT (OUTPATIENT)
Dept: ALLERGY | Facility: CLINIC | Age: 28
End: 2023-08-08
Payer: COMMERCIAL

## 2023-08-08 DIAGNOSIS — J30.9 ALLERGIC RHINITIS: ICD-10-CM

## 2023-08-08 DIAGNOSIS — J30.81 ALLERGIC RHINITIS DUE TO ANIMAL (CAT) (DOG) HAIR AND DANDER: Primary | ICD-10-CM

## 2023-08-08 PROCEDURE — 95120 IMMUNOTHERAPY ONE INJECTION: CPT

## 2023-08-08 RX ORDER — EPINEPHRINE 0.3 MG/.3ML
0.3 INJECTION SUBCUTANEOUS PRN
Qty: 2 EACH | Refills: 1 | Status: SHIPPED | OUTPATIENT
Start: 2023-08-08

## 2023-08-25 ENCOUNTER — TELEPHONE (OUTPATIENT)
Dept: ORTHOPEDICS | Facility: CLINIC | Age: 28
End: 2023-08-25
Payer: COMMERCIAL

## 2023-09-05 ENCOUNTER — ALLIED HEALTH/NURSE VISIT (OUTPATIENT)
Dept: ALLERGY | Facility: CLINIC | Age: 28
End: 2023-09-05
Payer: COMMERCIAL

## 2023-09-05 DIAGNOSIS — J30.9 ALLERGIC RHINITIS: ICD-10-CM

## 2023-09-05 DIAGNOSIS — J30.81 ALLERGIC RHINITIS DUE TO ANIMAL (CAT) (DOG) HAIR AND DANDER: Primary | ICD-10-CM

## 2023-09-05 PROCEDURE — 95120 IMMUNOTHERAPY ONE INJECTION: CPT

## 2023-09-14 PROBLEM — M25.561 CHRONIC PATELLOFEMORAL PAIN OF RIGHT KNEE: Status: RESOLVED | Noted: 2023-06-22 | Resolved: 2023-09-14

## 2023-09-14 PROBLEM — G89.29 CHRONIC PATELLOFEMORAL PAIN OF RIGHT KNEE: Status: RESOLVED | Noted: 2023-06-22 | Resolved: 2023-09-14

## 2023-09-14 NOTE — PROGRESS NOTES
"    DISCHARGE  Reason for Discharge: Pt completed 4 sessions of PT.  No significant change in pain status noted with HEP alone.  Pt noted overall improvement in knee pain with use of taping techniques; no long-term change in status without use of taping.  Patient chooses to discontinue therapy. Pt indicated she would continue with HEP/taping techniques.       Discharge Plan: Patient to continue home program.   07/31/23 0500   Appointment Info   Signing clinician's name / credentials Nata Ritchie PT   Total/Authorized Visits 6   Visits Used 4   Medical Diagnosis Chronic patellofemoral pain of right knee   PT Tx Diagnosis chronic patellofemoral knee pain   Quick Adds Certification   Progress Note/Certification   Start of Care Date 06/22/23   Onset of illness/injury or Date of Surgery   (6/2021)   Therapy Frequency 1x/week for 4 weeks, tapering to every other week for 4 weeks   Predicted Duration 8 weeks   Certification date from 06/22/23   Certification date to 08/17/23   PT Goal 1   Goal Identifier Stairs   Goal Description Ascend/descend stairs with pain 0/10   Rationale to maximize safety and independence within the community;to maximize safety and independence within the home   Goal Progress ongoing   Target Date 08/17/23   Subjective Report   Subjective Report Performing her HEP consistently. Notes taping the knee to be beneficial when in use. Continues to note knee discomfort on stairs, worse ascending vs descending.   Objective Measures   Objective Measures Objective Measure 1   Objective Measure 1   Objective Measure Step-up/down assessment   Details Step-down forwards: absence of knee pain with 5\", 6\" and 7' heights; mild retropatellar pain with step-up to 6\" and 7\" heights.   Treatment Interventions (PT)   Interventions Therapeutic Procedure/Exercise;Therapeutic Activity   Therapeutic Procedure/Exercise   Therapeutic Procedures: strength, endurance, ROM, flexibillity minutes (29018) 25   Therapeutic " "Procedures Ther Proc 5   Ther Proc 1 Bridge   Ther Proc 1 - Details Verbal review;  SL bridge, emphasis on level pelvis during movement, goal 30 reps. Able to perform 20 reps consecutively without issue. Continue   Ther Proc 2 Clam shell   Ther Proc 2 - Details Verbal review; SL clam shell with green TB, goal 30 reps. Progressed to leg lift with band.   Ther Proc 3 HIp abduction   Ther Proc 3 - Details Verbal review; SL hip abduction with use of red TB; performing 30 reps with fatigue upon completion. To continue   Ther Proc 4 Prone hip extension   Ther Proc 4 - Details Verbal review: standing with use of red/green TB, goal 30 reps.   Ther Proc 5 Sidestepping   Ther Proc 5 - Details Verbal review with use of Blue TB; reps to fatigue.   Skilled Intervention Instructed in strengthening ex's to promote painfree stair climbing.   Therapeutic Activity   Therapeutic Activities: dynamic activities to improve functional performance minutes (34590) 15   Therapeutic Activities Ther Act 2   Ther Act 1 Modified SL squat to 25\" height.   Ther Act 1 - Details Verbal review; reported increased pain with SL squat (L foot/heel forwards and touching for balance) so hasn't continued. Trial of SL squat-step down and back. Tolerated without increased knee pain/without taping.   Ther Act 2 Step-up   Ther Act 2 - Details Reviewed step-up to 7\" height with use of patellar taping-medial glide. Improved tolerance with use of tape. Will assessess effectiveness in exercise class. Provided info on tape info for purchase if helpful.   Skilled Intervention Instructed in closed chain ex's for improved load bearing tolerance.   Total Session Time   Timed Code Treatment Minutes 40   Total Treatment Time (sum of timed and untimed services) 40         Referring Provider:  Trevor Cordoba    "

## 2023-09-15 DIAGNOSIS — M25.561 RIGHT KNEE PAIN, UNSPECIFIED CHRONICITY: Primary | ICD-10-CM

## 2023-09-15 NOTE — TELEPHONE ENCOUNTER
DIAGNOSIS: Chronic patellofemoral pain of right knee.   APPOINTMENT DATE: 09/19/2023   NOTES STATUS DETAILS   OFFICE NOTE from referring provider SELF    OFFICE NOTE from other specialist Internal 05/05/2023 - Rosa Goodman MD - [facility] Internal Med    [facility] Physical Therapy   MEDICATION LIST Internal    LABS     XRAYS (IMAGES & REPORTS) PACS Internal:  05/05/2023 - Knee Standing

## 2023-09-19 ENCOUNTER — PRE VISIT (OUTPATIENT)
Dept: ORTHOPEDICS | Facility: CLINIC | Age: 28
End: 2023-09-19

## 2023-10-02 DIAGNOSIS — R06.2 WHEEZING: ICD-10-CM

## 2023-10-02 NOTE — TELEPHONE ENCOUNTER
Requested Prescriptions   Pending Prescriptions Disp Refills    ipratropium-albuterol (COMBIVENT RESPIMAT)  MCG/ACT inhaler 4 g 1     Sig: Inhale 1 puff into the lungs 4 times daily as needed for shortness of breath, wheezing or cough       There is no refill protocol information for this order            Last Written Prescription Date:  7/1/2023  Last Fill Quantity: 4g,  # refills: 1   Last office visit: 7/11/2023 ; last virtual visit: Visit date not found with prescribing provider:  Cezar Crews MD    Future Office Visit: 10/3/2023  Next 5 appointments (look out 90 days)      Oct 03, 2023  9:40 AM  Nurse Only with CS ALLERGY SHOTS  [facility] Specialty [facility] ([facility] ) [address] [place] [address] [address]  [facility] [address]  [phone]     Oct 31, 2023  9:40 AM  Nurse Only with CS ALLERGY SHOTS  [facility] ([facility] ) [address]  [facility] [address]  [phone]     Nov 28, 2023  9:40 AM  Nurse Only with CS ALLERGY SHOTS  [facility] ([facility] ) [address]  [facility] [address]  [phone]

## 2023-10-03 ENCOUNTER — ALLIED HEALTH/NURSE VISIT (OUTPATIENT)
Dept: ALLERGY | Facility: CLINIC | Age: 28
End: 2023-10-03
Payer: COMMERCIAL

## 2023-10-03 DIAGNOSIS — J30.9 ALLERGIC RHINITIS: ICD-10-CM

## 2023-10-03 DIAGNOSIS — J30.81 ALLERGIC RHINITIS DUE TO ANIMAL (CAT) (DOG) HAIR AND DANDER: Primary | ICD-10-CM

## 2023-10-03 PROCEDURE — 95120 IMMUNOTHERAPY ONE INJECTION: CPT

## 2023-10-03 NOTE — TELEPHONE ENCOUNTER
"Requested Prescriptions   Signed Prescriptions Disp Refills    ipratropium-albuterol (COMBIVENT RESPIMAT)  MCG/ACT inhaler 4 g 1     Sig: Inhale 1 puff into the lungs 4 times daily as needed for shortness of breath, wheezing or cough       Asthma Maintenance Inhalers - Anticholinergics Passed - 10/2/2023  9:21 AM        Passed - Patient is age 12 years or older        Passed - Recent (12 mo) or future (30 days) visit within the authorizing provider's specialty     Patient has had an office visit with the authorizing provider or a provider within the authorizing providers department within the previous 12 mos or has a future within next 30 days. See \"Patient Info\" tab in inbasket, or \"Choose Columns\" in Meds & Orders section of the refill encounter.              Passed - Medication is active on med list       Short-Acting Beta Agonist Inhalers Protocol  Passed - 10/2/2023  9:21 AM        Passed - Patient is age 12 or older        Passed - Recent (12 mo) or future (30 days) visit within the authorizing provider's specialty     Patient has had an office visit with the authorizing provider or a provider within the authorizing providers department within the previous 12 mos or has a future within next 30 days. See \"Patient Info\" tab in inbasket, or \"Choose Columns\" in Meds & Orders section of the refill encounter.              Passed - Medication is active on med list       Asthma Nebs Protocol Passed - 10/2/2023  9:21 AM        Passed - Patient is age 4 years or older        Passed - Recent (12 mo) or future (30 days) visit within the authorizing provider's specialty     Patient has had an office visit with the authorizing provider or a provider within the authorizing providers department within the previous 12 mos or has a future within next 30 days. See \"Patient Info\" tab in inbasket, or \"Choose Columns\" in Meds & Orders section of the refill encounter.              Passed - Medication is active on med list    "        Patient refill sent for patient. Recent appt with Dr. Crews said to trial Combivent prior to exercise.     Dara Mercado, BSN, RN

## 2023-10-31 ENCOUNTER — ALLIED HEALTH/NURSE VISIT (OUTPATIENT)
Dept: ALLERGY | Facility: CLINIC | Age: 28
End: 2023-10-31
Payer: COMMERCIAL

## 2023-10-31 ENCOUNTER — OFFICE VISIT (OUTPATIENT)
Dept: ORTHOPEDICS | Facility: CLINIC | Age: 28
End: 2023-10-31
Payer: COMMERCIAL

## 2023-10-31 ENCOUNTER — ANCILLARY PROCEDURE (OUTPATIENT)
Dept: CT IMAGING | Facility: CLINIC | Age: 28
End: 2023-10-31
Attending: ORTHOPAEDIC SURGERY
Payer: COMMERCIAL

## 2023-10-31 ENCOUNTER — ANCILLARY PROCEDURE (OUTPATIENT)
Dept: GENERAL RADIOLOGY | Facility: CLINIC | Age: 28
End: 2023-10-31
Attending: ORTHOPAEDIC SURGERY
Payer: COMMERCIAL

## 2023-10-31 VITALS — HEIGHT: 71 IN | BODY MASS INDEX: 26.74 KG/M2 | WEIGHT: 191 LBS

## 2023-10-31 DIAGNOSIS — J30.9 ALLERGIC RHINITIS: ICD-10-CM

## 2023-10-31 DIAGNOSIS — J30.81 ALLERGIC RHINITIS DUE TO ANIMAL (CAT) (DOG) HAIR AND DANDER: ICD-10-CM

## 2023-10-31 DIAGNOSIS — J30.81 ALLERGIC RHINITIS DUE TO ANIMAL (CAT) (DOG) HAIR AND DANDER: Primary | ICD-10-CM

## 2023-10-31 DIAGNOSIS — M25.561 RIGHT KNEE PAIN, UNSPECIFIED CHRONICITY: ICD-10-CM

## 2023-10-31 DIAGNOSIS — M25.561 RIGHT KNEE PAIN, UNSPECIFIED CHRONICITY: Primary | ICD-10-CM

## 2023-10-31 PROCEDURE — 73560 X-RAY EXAM OF KNEE 1 OR 2: CPT | Mod: RT | Performed by: RADIOLOGY

## 2023-10-31 PROCEDURE — 99203 OFFICE O/P NEW LOW 30 MIN: CPT | Performed by: ORTHOPAEDIC SURGERY

## 2023-10-31 PROCEDURE — 95120 IMMUNOTHERAPY ONE INJECTION: CPT

## 2023-10-31 PROCEDURE — 99207 XR KNEE BILATERAL 1/2 VIEWS: CPT | Mod: LT | Performed by: RADIOLOGY

## 2023-10-31 PROCEDURE — 77073 BONE LENGTH STUDIES: CPT | Performed by: STUDENT IN AN ORGANIZED HEALTH CARE EDUCATION/TRAINING PROGRAM

## 2023-10-31 PROCEDURE — 73700 CT LOWER EXTREMITY W/O DYE: CPT | Mod: RT | Performed by: RADIOLOGY

## 2023-10-31 NOTE — LETTER
10/31/2023         RE: Cristel Irving  [address]  [place] [address]        Dear Colleague,    Thank you for referring your patient, Cristel Irving, to the [facility] [place]. Please see a copy of my visit note below.    Patient is a 28-year-old female with complaints of left anterior knee pain.  She is currently a student at [employer], with aspirations of becoming a nurse.    She has been through physical therapy successfully with no change in her pain pattern.    She is note that she has had pain for 2 to 3 years.  It started when she started to do some cross fitness type exercises 2 days/week.  She has no pain when she is not active she has no pain outside weightbearing activities and the activities that cause her the most problems are squats and lunges.    She denies any history of an injury to 1 or both knees.  The knee that bothers her the most is her left knee only.  She denies locking catching giving way.    Physical exam reveals a tall female, BMI compute to 26    Examination of patient's hip show satisfactory range of motion with external rotation greater than internal rotation.    Examination of patient's left knee reveal mild hyperextension to 140 degrees of flexion.  Kneecap tracks well through an active arc of motion.  No crepitus.  No maltracking.  No swelling    Past patella mobility is 1 quadrant lateral mobility 1 quadrant medial mobility.  No palpable tenderness at the joint line no palpable tenderness over the patella tendon    Increase in external foot progression left greater than right.    Imaging was reviewed.  No significant findings.  No patellofemoral instability factors are present.    Plan: I do not believe that the patient has external tibial torsion to the degree that it might need treated but I do believe that there is rotational variation in her 2 limbs of the tibial side and that it might be of value to objectify  this and possibly modify her bent knee activities to accommodate this change in external rotation.    She seemed to follow my discussion on this topic and wants to go ahead with a CT scan to objectify the degree of tibial torsion.    She will follow-up with me virtually next week    Milena Marie MD  Professor Orthopedic Surgery  [facility]     Add:  CT results  Right femoral anteversion is 30 degrees.  Left femoral anteversion is 26 degrees.    Tibial torsion on the right is 36 degrees.  Tibial torsion on the left is 36 degrees.    Tibial Tuberosity to Trochlear groove distance:  Right: 17 mm.  Left: 11 mm.    Femoral Tibial Rotation:  Right tibia is 3 degrees rotated externally relative to femur.  Left tibia is 2 degrees rotated externally relative to femur.

## 2023-10-31 NOTE — NURSING NOTE
"Reason For Visit:   Chief Complaint   Patient presents with    Consult     Chronic patellofemoral pain of right knee / self referred       Primary MD: Rosa Goodman  Ref. MD: Nata Ellsworth physical therapist    ?  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    Date of injury: No  Type of injury: No.    Date of surgery: No  Type of surgery: No.    Smoker: No  Request smoking cessation information: No    Ht 1.803 m (5' 10.98\")   Wt 86.6 kg (191 lb)   BMI 26.65 kg/m      Pain Assessment  Patient Currently in Pain: Denies             Niyah Rayo LPN   "

## 2023-10-31 NOTE — PROGRESS NOTES
Patient is a 28-year-old female with complaints of left anterior knee pain.  She is currently a student at [employer], with aspirations of becoming a nurse.    She has been through physical therapy successfully with no change in her pain pattern.    She is note that she has had pain for 2 to 3 years.  It started when she started to do some cross fitness type exercises 2 days/week.  She has no pain when she is not active she has no pain outside weightbearing activities and the activities that cause her the most problems are squats and lunges.    She denies any history of an injury to 1 or both knees.  The knee that bothers her the most is her left knee only.  She denies locking catching giving way.    Physical exam reveals a tall female, BMI compute to 26    Examination of patient's hip show satisfactory range of motion with external rotation greater than internal rotation.    Examination of patient's left knee reveal mild hyperextension to 140 degrees of flexion.  Kneecap tracks well through an active arc of motion.  No crepitus.  No maltracking.  No swelling    Past patella mobility is 1 quadrant lateral mobility 1 quadrant medial mobility.  No palpable tenderness at the joint line no palpable tenderness over the patella tendon    Increase in external foot progression left greater than right.    Imaging was reviewed.  No significant findings.  No patellofemoral instability factors are present.    Plan: I do not believe that the patient has external tibial torsion to the degree that it might need treated but I do believe that there is rotational variation in her 2 limbs of the tibial side and that it might be of value to objectify this and possibly modify her bent knee activities to accommodate this change in external rotation.    She seemed to follow my discussion on this topic and wants to go ahead with a CT scan to objectify the degree of tibial torsion.    She will follow-up with me virtually next  anthony Marie MD  Professor Orthopedic Surgery  [facility]     Add:  CT results  Right femoral anteversion is 30 degrees.  Left femoral anteversion is 26 degrees.    Tibial torsion on the right is 36 degrees.  Tibial torsion on the left is 36 degrees.    Tibial Tuberosity to Trochlear groove distance:  Right: 17 mm.  Left: 11 mm.    Femoral Tibial Rotation:  Right tibia is 3 degrees rotated externally relative to femur.  Left tibia is 2 degrees rotated externally relative to femur.

## 2023-10-31 NOTE — TELEPHONE ENCOUNTER
ALLERGY SOLUTION RE-ORDER REQUEST    Cristel Irving 1995 MRN: [id number]    DATE NEEDED:  2 weeks  Vial Color Content    Vial Size  Red 1:1 Cat, Dog    5 mL        Serum reorder consent signed and patient/parent was advised that new serums would be ordered through the pharmacy and billed to their insurance company when they arrive in clinic. Yes    Shot Clinic Location:  [facility].  Ship to Location: [facility].  Serum billed to:  [facility].    Special Instructions:  N/A        Requester Signature  Dara Mercado RN

## 2023-11-01 NOTE — TELEPHONE ENCOUNTER
change for  Dog antigen.  Please update prescription to reflect [facility] Dog Hair-Dander- 1:650 w/v HS (Oneida Faust).     Eliza BECERRA RN  Specialty/Allergy Clinic

## 2023-11-03 DIAGNOSIS — J30.81 ALLERGIC RHINITIS DUE TO ANIMAL (CAT) (DOG) HAIR AND DANDER: Primary | ICD-10-CM

## 2023-11-03 PROCEDURE — 95165 ANTIGEN THERAPY SERVICES: CPT | Performed by: INTERNAL MEDICINE

## 2023-11-03 NOTE — PROGRESS NOTES
Allergy serums billed to [facility].     Vials billed below:    Vial Color Content                      Vial Size Expiration Date  Red 1:1 Cat, Dog 5mL  11/3/24      Billed 10 units    Checked by Enrique Oglesby / LPN        Signature  Enrique Oglesby LPN

## 2023-11-07 ENCOUNTER — VIRTUAL VISIT (OUTPATIENT)
Dept: ORTHOPEDICS | Facility: CLINIC | Age: 28
End: 2023-11-07
Payer: COMMERCIAL

## 2023-11-07 DIAGNOSIS — M21.869 TIBIAL TORSION: Primary | ICD-10-CM

## 2023-11-07 PROCEDURE — 99213 OFFICE O/P EST LOW 20 MIN: CPT | Mod: VID | Performed by: ORTHOPAEDIC SURGERY

## 2023-11-07 NOTE — LETTER
11/7/2023         RE: Cristel Irving  [address]  [place] [address]        Dear Colleague,    Thank you for referring your patient, Cristel Irving, to the [facility] [place]. Please see a copy of my visit note below.    Reason For Visit:   Chief Complaint   Patient presents with    RECHECK     video visit mychart, Follow up Review CT right knee       Primary MD: Rosa Goodman  Ref. MD: EST    ?  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     Date of injury: No  Type of injury: No.     Date of surgery: No  Type of surgery: No.     Smoker: No  Request smoking cessation information: No    There were no vitals taken for this visit.    Pain Assessment  Patient Currently in Pain: Yes  0-10 Pain Scale: 3  Primary Pain Location: Knee    BEBETO No is a 28 year old who is being evaluated via a billable video visit.      How would you like to obtain your AVS? MyChart  If the video visit is dropped, the invitation should be resent by: Text to cell phone: [phone]  Will anyone else be joining your video visit? No        Video-Visit Details    Type of service:  Video Visit   Video Start Time:  11,50  Video End Time: 12:14    Originating Location (pt. Location): Home    Distant Location (provider location):  On-site  Platform used for Video Visit: [employer]       This is a virtual visit to discuss the CT's findings.  Patient's main complaint is right greater than left anterior knee pain.       CT results  Right femoral anteversion is 30 degrees.  Left femoral anteversion is 26 degrees.    Tibial torsion on the right is 36 degrees.  Tibial torsion on the left is 36 degrees.    Tibial Tuberosity to Trochlear groove distance:  Right: 17 mm.  Left: 11 mm.    Femoral Tibial Rotation:  Right tibia is 3 degrees rotated externally relative to femur.  Left tibia is 2 degrees rotated externally relative to femur.    I believe that her right and left tibial torsion  is at a degree that we feel could be related to anterior knee pain and typically recommend surgical intervention if pain should persist and is a contribution to reduction of activity level and quality of life.  That said I also opined that I do not believe the surgery should be done to keep somebody active in a sport when they have no pain in day-to-day activities.    Since her right side is her more painful side, and femoral anteversion is 30 degrees, I believe that there will be a more satisfactory limb alignment if both the femur and the tibia are derotated should that be her decision.    I had the opportunity for Dr. Felipe Goldberg to also see the patient virtually.  He explained the surgical procedure and the postoperative course.    She will think about her options and contact us accordingly.    Milena Marie MD  Professor Orthopedic Surgery  [facility]

## 2023-11-07 NOTE — PROGRESS NOTES
Reason For Visit:   Chief Complaint   Patient presents with    RECHECK     video visit mychart, Follow up Review CT right knee       Primary MD: Rosa Goodman  Ref. MD: EST    ?  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     Date of injury: No  Type of injury: No.     Date of surgery: No  Type of surgery: No.     Smoker: No  Request smoking cessation information: No    There were no vitals taken for this visit.    Pain Assessment  Patient Currently in Pain: Yes  0-10 Pain Scale: 3  Primary Pain Location: Knee    BEBETO No is a 28 year old who is being evaluated via a billable video visit.      How would you like to obtain your AVS? MyChart  If the video visit is dropped, the invitation should be resent by: Text to cell phone: [phone]  Will anyone else be joining your video visit? No        Video-Visit Details    Type of service:  Video Visit   Video Start Time:  11,50  Video End Time: 12:14    Originating Location (pt. Location): Home    Distant Location (provider location):  On-site  Platform used for Video Visit: [employer]       This is a virtual visit to discuss the CT's findings.  Patient's main complaint is right greater than left anterior knee pain.       CT results  Right femoral anteversion is 30 degrees.  Left femoral anteversion is 26 degrees.    Tibial torsion on the right is 36 degrees.  Tibial torsion on the left is 36 degrees.    Tibial Tuberosity to Trochlear groove distance:  Right: 17 mm.  Left: 11 mm.    Femoral Tibial Rotation:  Right tibia is 3 degrees rotated externally relative to femur.  Left tibia is 2 degrees rotated externally relative to femur.    I believe that her right and left tibial torsion is at a degree that we feel could be related to anterior knee pain and typically recommend surgical intervention if pain should persist and is a contribution to reduction of activity level and quality of life.  That said I also opined that I do not believe the surgery should be  done to keep somebody active in a sport when they have no pain in day-to-day activities.    Since her right side is her more painful side, and femoral anteversion is 30 degrees, I believe that there will be a more satisfactory limb alignment if both the femur and the tibia are derotated should that be her decision.    I had the opportunity for Dr. Felipe Goldberg to also see the patient virtually.  He explained the surgical procedure and the postoperative course.    She will think about her options and contact us accordingly.    Milena Marie MD  Professor Orthopedic Surgery  [facility]

## 2023-11-09 NOTE — PROGRESS NOTES
Allergy serums received at [facility].    Vials received below:    Vial Color                               Content                      Vial Size         Expiration Date  Red 1:1                                   Cat, Dog                      5mL                 11/3/24      Signature  LALITHA ChávezN, RN  11/9/2023 1:21 PM

## 2023-11-28 ENCOUNTER — ALLIED HEALTH/NURSE VISIT (OUTPATIENT)
Dept: ALLERGY | Facility: CLINIC | Age: 28
End: 2023-11-28
Payer: COMMERCIAL

## 2023-11-28 DIAGNOSIS — J30.9 ALLERGIC RHINITIS: ICD-10-CM

## 2023-11-28 DIAGNOSIS — J30.81 ALLERGIC RHINITIS DUE TO ANIMAL (CAT) (DOG) HAIR AND DANDER: Primary | ICD-10-CM

## 2023-11-28 PROCEDURE — 95120 IMMUNOTHERAPY ONE INJECTION: CPT

## 2023-11-28 NOTE — PROGRESS NOTES
Cristel Irving presents to clinic today at the request of Cezar Crews MD (ordering provider) for Allergy Immunotherapy injection(s).     This service provided today was under the care of Cezar Crews MD; the supervising provider of the day; who was available if needed.    Patient presented after waiting 30 minutes with no reaction to  injections. Discharged from clinic.    LALITHA SneedN, RN

## 2023-12-19 ENCOUNTER — ALLIED HEALTH/NURSE VISIT (OUTPATIENT)
Dept: ALLERGY | Facility: CLINIC | Age: 28
End: 2023-12-19
Payer: COMMERCIAL

## 2023-12-19 DIAGNOSIS — J30.81 ALLERGIC RHINITIS DUE TO ANIMAL (CAT) (DOG) HAIR AND DANDER: Primary | ICD-10-CM

## 2023-12-19 DIAGNOSIS — J30.9 ALLERGIC RHINITIS: ICD-10-CM

## 2023-12-19 PROCEDURE — 95120 IMMUNOTHERAPY ONE INJECTION: CPT

## 2023-12-19 NOTE — PROGRESS NOTES
Cristel Irving presents to clinic today at the request of Cezar Crews MD (ordering provider) for Allergy Immunotherapy injection(s).     This service provided today was under the care of Cezar Crews MD; the supervising provider of the day; who was available if needed.    New vial started and patient received 0.5mL Red vial. Dr. Crews notified. If patient tolerates 0.5mL today then patient may receive 0.5mL at their next injection on 1/2/24    Patient presented after waiting 30 minutes with no reaction to  injections. Discharged from clinic.    Rich ODELL, BSN, RN

## 2024-01-02 ENCOUNTER — ALLIED HEALTH/NURSE VISIT (OUTPATIENT)
Dept: ALLERGY | Facility: CLINIC | Age: 29
End: 2024-01-02
Payer: COMMERCIAL

## 2024-01-02 DIAGNOSIS — J30.9 ALLERGIC RHINITIS: ICD-10-CM

## 2024-01-02 DIAGNOSIS — J30.81 ALLERGIC RHINITIS DUE TO ANIMAL (CAT) (DOG) HAIR AND DANDER: Primary | ICD-10-CM

## 2024-01-02 PROCEDURE — 95120 IMMUNOTHERAPY ONE INJECTION: CPT

## 2024-01-02 NOTE — PROGRESS NOTES
Cristel Irving presents to clinic today at the request of Cezar Crews MD (ordering provider) for Allergy Immunotherapy injection(s).     This service provided today was under the care of David Perlman, MD; the supervising provider of the day; who was available if needed.    Patient presented after waiting 30 minutes with no reaction to  injections. Discharged from clinic.    LALITHA SneedN, RN

## 2024-01-09 ENCOUNTER — OFFICE VISIT (OUTPATIENT)
Dept: ALLERGY | Facility: CLINIC | Age: 29
End: 2024-01-09
Payer: COMMERCIAL

## 2024-01-09 VITALS
WEIGHT: 188.7 LBS | HEART RATE: 70 BPM | DIASTOLIC BLOOD PRESSURE: 65 MMHG | SYSTOLIC BLOOD PRESSURE: 100 MMHG | OXYGEN SATURATION: 98 % | BODY MASS INDEX: 26.33 KG/M2

## 2024-01-09 DIAGNOSIS — J30.81 ALLERGIC RHINITIS DUE TO ANIMAL (CAT) (DOG) HAIR AND DANDER: Primary | ICD-10-CM

## 2024-01-09 PROCEDURE — 99213 OFFICE O/P EST LOW 20 MIN: CPT | Performed by: INTERNAL MEDICINE

## 2024-01-09 NOTE — PATIENT INSTRUCTIONS
Combivent, 1 puff 15 minute prior to exercise.   Will continue allergy shots.  Try Flonase Sensimist 1-2 sprays each nostril daily (as needed)  Zyrtec 10 mg at night      Allergy Staff Appt Hours Shot Hours Location       Physician   Cezar Crews MD      Support Staff   AMBREEN Hernandez, AMBREEN SENIOR, KETAN HERZOG MA      Mondays Tuesdays Thursdays and Fridays:      [facility] 7-5 Wednesdays         Close                Mondays, Tuesdays and Fridays:  7:20 - 3:40              [place] [address]  [place] [address]  Allergy appointment  line: [phone]    Pulmonary Function Scheduling:  [place]: [phone]

## 2024-01-09 NOTE — PROGRESS NOTES
Cristel Irving was seen in the Allergy Clinic at [facility].    Cristel Irving is a 28 year old female being seen today for ongoing evaluation of allergy shot.    Since the last visit the patient has been mildly improved.    She has been on allergy shots for over 1 year.  She is receiving allergy shots to dogs and cats.  She does have 2 cats at home now.  He only intermittently had cats in her house prior to starting shots.    She does recognize that she has had some improvement with cat exposure with the allergy shots however symptoms or not significantly improved.  However the improvement with the allergy shots is enough that she would like to continue with the allergy injections.  She is not taking Zyrtec on a daily basis at this time.    She does have exercise-induced wheezing and shortness of breath.  Albuterol and Combivent were previously prescribed.  She found the Combivent to be helpful.  She does use this with improvement.  She rarely has any symptoms with cat exposure.    Past Medical History:   Diagnosis Date    JESSIE (generalized anxiety disorder)     MDD (major depressive disorder)      Family History   Problem Relation Age of Onset    Anxiety Disorder Mother     Depression Father     Depression Paternal Grandmother     Diabetes No family hx of     Cerebrovascular Disease No family hx of     Coronary Artery Disease Early Onset No family hx of     Breast Cancer No family hx of     Ovarian Cancer No family hx of     Colon Cancer No family hx of      Past Surgical History:   Procedure Laterality Date    RHINOPLASTY  2022         Current Outpatient Medications:     busPIRone (BUSPAR) 10 MG tablet, Take 1 tablet (10 mg) by mouth daily, Disp: 90 tablet, Rfl: 3    ipratropium-albuterol (COMBIVENT RESPIMAT)  MCG/ACT inhaler, Inhale 1 puff into the lungs 4 times daily as needed for shortness of breath, wheezing or cough, Disp: 4 g, Rfl: 1    levonorgestrel-ethinyl estrad  (TORI) 90-20 MCG tablet, Take 1 tablet by mouth daily, Disp: 84 tablet, Rfl: 1    sertraline (ZOLOFT) 100 MG tablet, Take 1 tablet (100 mg) by mouth daily, Disp: 90 tablet, Rfl: 3    albuterol (PROAIR HFA/PROVENTIL HFA/VENTOLIN HFA) 108 (90 Base) MCG/ACT inhaler, Inhale 2 puffs into the lungs every 4 hours as needed for shortness of breath, wheezing or cough (Patient not taking: Reported on 1/9/2024), Disp: 18 g, Rfl: 1    EPINEPHrine (ANY BX GENERIC EQUIV) 0.3 MG/0.3ML injection 2-pack, Inject 0.3 mLs (0.3 mg) into the muscle as needed for anaphylaxis (Per allergy immunotherapy patient protocol.) May repeat one time in 5-15 minutes if response to initial dose is inadequate., Disp: 2 each, Rfl: 1    ORDER FOR ALLERGEN IMMUNOTHERAPY, Name of Mix: Mix #1  Cat, Dog Cat Hair, Standardized A.P. 10,000 BAU/mL, HS  2.0 ml UF Dog Hair-Dander- 1:650 w/v HS  1.0 ml  Diluent: HSA 2 ml to 5ml, Disp: 5 mL, Rfl: PRN  No Known Allergies      EXAM:   /65 (BP Location: Left arm, Patient Position: Sitting, Cuff Size: Adult Regular)   Pulse 70   Wt 85.6 kg (188 lb 11.2 oz)   SpO2 98%   BMI 26.33 kg/m      Constitutional:       General: She is not in acute distress.     Appearance: Normal appearance. She is not ill-appearing.   HENT:      Head: Normocephalic and atraumatic.      Nose: She has 3+ left inferior turban hypertrophy     mouth/Throat:      Mouth: Mucous membranes are moist.      Pharynx: Oropharynx is clear. No posterior oropharyngeal erythema.   Eyes:      General:         Right eye: No discharge.         Left eye: No discharge.   Cardiovascular:      Rate and Rhythm: Normal rate and regular rhythm.      Heart sounds: Normal heart sounds.   Pulmonary:      Effort: Pulmonary effort is normal.      Breath sounds: Normal breath sounds. No wheezing or rhonchi.   Skin:     General: Skin is warm.      Findings: No erythema or rash.   Neurological:      General: No focal deficit present.      Mental Status: She is  alert. Mental status is at baseline.   Psychiatric:         Mood and Affect: Mood normal.         Behavior: Behavior normal.        ASSESSMENT/PLAN:  Cristel Irving is a 28 year old female seen today for ongoing evaluation of cat and dog allergy for which she is receiving allergy immunotherapy.  She also has exercise-induced asthma for which Combivent is providing benefit.  She would like the allergy shots to continue.  Total duration will be approximately 4 years.    She does have persistent symptoms recommended trying Zyrtec 10 mg at night on a regular basis and also the Flonase nasal spray.    Combivent, 1 puff 15 minute prior to exercise.   Will continue allergy shots.  Try Flonase Sensimist 1-2 sprays each nostril daily (as needed)  Zyrtec 10 mg at night    Follow-up in 6 months      Thank you for allowing me to participate in the care of Cristel Irving.      I spent 20 minutes on the date of the encounter doing chart review, history and exam, documentation and further coordination as noted above exclusive of separately reported interpretations    Cezar Crews MD  Allergy/Immunology  [facility]

## 2024-01-09 NOTE — LETTER
1/9/2024         RE: Cristel Irving  [address]  [place] [address]        Dear Colleague,    Thank you for referring your patient, Cristel Irving, to the [facility]. Please see a copy of my visit note below.    Cristel Irving was seen in the Allergy Clinic at [facility].    Cristel Irving is a 28 year old female being seen today for ongoing evaluation of allergy shot.    Since the last visit the patient has been mildly improved.    She has been on allergy shots for over 1 year.  She is receiving allergy shots to dogs and cats.  She does have 2 cats at home now.  He only intermittently had cats in her house prior to starting shots.    She does recognize that she has had some improvement with cat exposure with the allergy shots however symptoms or not significantly improved.  However the improvement with the allergy shots is enough that she would like to continue with the allergy injections.  She is not taking Zyrtec on a daily basis at this time.    She does have exercise-induced wheezing and shortness of breath.  Albuterol and Combivent were previously prescribed.  She found the Combivent to be helpful.  She does use this with improvement.  She rarely has any symptoms with cat exposure.    Past Medical History:   Diagnosis Date     JESSIE (generalized anxiety disorder)      MDD (major depressive disorder)      Family History   Problem Relation Age of Onset     Anxiety Disorder Mother      Depression Father      Depression Paternal Grandmother      Diabetes No family hx of      Cerebrovascular Disease No family hx of      Coronary Artery Disease Early Onset No family hx of      Breast Cancer No family hx of      Ovarian Cancer No family hx of      Colon Cancer No family hx of      Past Surgical History:   Procedure Laterality Date     RHINOPLASTY  2022         Current Outpatient Medications:      busPIRone (BUSPAR) 10 MG tablet,  Take 1 tablet (10 mg) by mouth daily, Disp: 90 tablet, Rfl: 3     ipratropium-albuterol (COMBIVENT RESPIMAT)  MCG/ACT inhaler, Inhale 1 puff into the lungs 4 times daily as needed for shortness of breath, wheezing or cough, Disp: 4 g, Rfl: 1     levonorgestrel-ethinyl estrad (TORI) 90-20 MCG tablet, Take 1 tablet by mouth daily, Disp: 84 tablet, Rfl: 1     sertraline (ZOLOFT) 100 MG tablet, Take 1 tablet (100 mg) by mouth daily, Disp: 90 tablet, Rfl: 3     albuterol (PROAIR HFA/PROVENTIL HFA/VENTOLIN HFA) 108 (90 Base) MCG/ACT inhaler, Inhale 2 puffs into the lungs every 4 hours as needed for shortness of breath, wheezing or cough (Patient not taking: Reported on 1/9/2024), Disp: 18 g, Rfl: 1     EPINEPHrine (ANY BX GENERIC EQUIV) 0.3 MG/0.3ML injection 2-pack, Inject 0.3 mLs (0.3 mg) into the muscle as needed for anaphylaxis (Per allergy immunotherapy patient protocol.) May repeat one time in 5-15 minutes if response to initial dose is inadequate., Disp: 2 each, Rfl: 1     ORDER FOR ALLERGEN IMMUNOTHERAPY, Name of Mix: Mix #1  Cat, Dog Cat Hair, Standardized A.P. 10,000 BAU/mL, HS  2.0 ml UF Dog Hair-Dander- 1:650 w/v HS  1.0 ml  Diluent: HSA 2 ml to 5ml, Disp: 5 mL, Rfl: PRN  No Known Allergies      EXAM:   /65 (BP Location: Left arm, Patient Position: Sitting, Cuff Size: Adult Regular)   Pulse 70   Wt 85.6 kg (188 lb 11.2 oz)   SpO2 98%   BMI 26.33 kg/m      Constitutional:       General: She is not in acute distress.     Appearance: Normal appearance. She is not ill-appearing.   HENT:      Head: Normocephalic and atraumatic.      Nose: She has 3+ left inferior turban hypertrophy     mouth/Throat:      Mouth: Mucous membranes are moist.      Pharynx: Oropharynx is clear. No posterior oropharyngeal erythema.   Eyes:      General:         Right eye: No discharge.         Left eye: No discharge.   Cardiovascular:      Rate and Rhythm: Normal rate and regular rhythm.      Heart sounds: Normal  heart sounds.   Pulmonary:      Effort: Pulmonary effort is normal.      Breath sounds: Normal breath sounds. No wheezing or rhonchi.   Skin:     General: Skin is warm.      Findings: No erythema or rash.   Neurological:      General: No focal deficit present.      Mental Status: She is alert. Mental status is at baseline.   Psychiatric:         Mood and Affect: Mood normal.         Behavior: Behavior normal.        ASSESSMENT/PLAN:  Cristel Irving is a 28 year old female seen today for ongoing evaluation of cat and dog allergy for which she is receiving allergy immunotherapy.  She also has exercise-induced asthma for which Combivent is providing benefit.  She would like the allergy shots to continue.  Total duration will be approximately 4 years.    She does have persistent symptoms recommended trying Zyrtec 10 mg at night on a regular basis and also the Flonase nasal spray.    Combivent, 1 puff 15 minute prior to exercise.   Will continue allergy shots.  Try Flonase Sensimist 1-2 sprays each nostril daily (as needed)  Zyrtec 10 mg at night    Follow-up in 6 months      Thank you for allowing me to participate in the care of Cristel Irving.      I spent 20 minutes on the date of the encounter doing chart review, history and exam, documentation and further coordination as noted above exclusive of separately reported interpretations    Cezar Crews MD  Allergy/Immunology  [facility]       Again, thank you for allowing me to participate in the care of your patient.        Sincerely,        Cezar Crews MD

## 2024-01-30 ENCOUNTER — ALLIED HEALTH/NURSE VISIT (OUTPATIENT)
Dept: ALLERGY | Facility: CLINIC | Age: 29
End: 2024-01-30
Payer: COMMERCIAL

## 2024-01-30 DIAGNOSIS — J30.9 ALLERGIC RHINITIS: ICD-10-CM

## 2024-01-30 DIAGNOSIS — J30.81 ALLERGIC RHINITIS DUE TO ANIMAL (CAT) (DOG) HAIR AND DANDER: Primary | ICD-10-CM

## 2024-01-30 PROCEDURE — 95120 IMMUNOTHERAPY ONE INJECTION: CPT

## 2024-01-30 NOTE — PROGRESS NOTES
Cristel Irving presents to clinic today at the request of Cezar Crews MD (ordering provider) for Allergy Immunotherapy injection(s).       This service provided today was under the care of Cezar Crews MD; the supervising provider of the day; who was available if needed.      Patient presented after waiting 30 minutes with no reaction to  injections. Discharged from clinic.    Kristi Alcaraz, LALITHA GannonN, RN

## 2024-02-27 ENCOUNTER — ALLIED HEALTH/NURSE VISIT (OUTPATIENT)
Dept: ALLERGY | Facility: CLINIC | Age: 29
End: 2024-02-27
Payer: COMMERCIAL

## 2024-02-27 DIAGNOSIS — J30.9 ALLERGIC RHINITIS: ICD-10-CM

## 2024-02-27 DIAGNOSIS — J30.81 ALLERGIC RHINITIS DUE TO ANIMAL (CAT) (DOG) HAIR AND DANDER: Primary | ICD-10-CM

## 2024-02-27 PROCEDURE — 95120 IMMUNOTHERAPY ONE INJECTION: CPT

## 2024-03-20 ENCOUNTER — TRANSFERRED RECORDS (OUTPATIENT)
Dept: HEALTH INFORMATION MANAGEMENT | Facility: CLINIC | Age: 29
End: 2024-03-20
Payer: COMMERCIAL

## 2024-03-26 ENCOUNTER — ALLIED HEALTH/NURSE VISIT (OUTPATIENT)
Dept: ALLERGY | Facility: CLINIC | Age: 29
End: 2024-03-26
Payer: COMMERCIAL

## 2024-03-26 DIAGNOSIS — J30.81 ALLERGIC RHINITIS DUE TO ANIMAL (CAT) (DOG) HAIR AND DANDER: Primary | ICD-10-CM

## 2024-03-26 DIAGNOSIS — J30.9 ALLERGIC RHINITIS: ICD-10-CM

## 2024-03-26 PROCEDURE — 95125 IMMUNOTHERAPY 2/> INJECTIONS: CPT

## 2024-04-23 ENCOUNTER — ALLIED HEALTH/NURSE VISIT (OUTPATIENT)
Dept: ALLERGY | Facility: CLINIC | Age: 29
End: 2024-04-23
Payer: COMMERCIAL

## 2024-04-23 DIAGNOSIS — J30.81 ALLERGIC RHINITIS DUE TO ANIMAL (CAT) (DOG) HAIR AND DANDER: Primary | ICD-10-CM

## 2024-04-23 DIAGNOSIS — J30.9 ALLERGIC RHINITIS: ICD-10-CM

## 2024-04-23 PROCEDURE — 95120 IMMUNOTHERAPY ONE INJECTION: CPT

## 2024-05-15 ENCOUNTER — OFFICE VISIT (OUTPATIENT)
Dept: INTERNAL MEDICINE | Facility: CLINIC | Age: 29
End: 2024-05-15
Payer: COMMERCIAL

## 2024-05-15 VITALS
SYSTOLIC BLOOD PRESSURE: 104 MMHG | TEMPERATURE: 97.8 F | WEIGHT: 206.5 LBS | BODY MASS INDEX: 28.81 KG/M2 | DIASTOLIC BLOOD PRESSURE: 66 MMHG | HEART RATE: 85 BPM | OXYGEN SATURATION: 99 %

## 2024-05-15 DIAGNOSIS — L70.0 ACNE VULGARIS: ICD-10-CM

## 2024-05-15 DIAGNOSIS — F41.1 GAD (GENERALIZED ANXIETY DISORDER): ICD-10-CM

## 2024-05-15 DIAGNOSIS — F33.42 RECURRENT MAJOR DEPRESSIVE DISORDER, IN FULL REMISSION (H): ICD-10-CM

## 2024-05-15 DIAGNOSIS — Z00.00 ROUTINE HISTORY AND PHYSICAL EXAMINATION OF ADULT: Primary | ICD-10-CM

## 2024-05-15 DIAGNOSIS — Z30.41 ENCOUNTER FOR BIRTH CONTROL PILLS MAINTENANCE: ICD-10-CM

## 2024-05-15 PROCEDURE — 99395 PREV VISIT EST AGE 18-39: CPT | Performed by: INTERNAL MEDICINE

## 2024-05-15 RX ORDER — SERTRALINE HYDROCHLORIDE 100 MG/1
100 TABLET, FILM COATED ORAL DAILY
Qty: 90 TABLET | Refills: 3 | Status: SHIPPED | OUTPATIENT
Start: 2024-05-15

## 2024-05-15 RX ORDER — BUSPIRONE HYDROCHLORIDE 10 MG/1
10 TABLET ORAL DAILY
Qty: 90 TABLET | Refills: 3 | Status: SHIPPED | OUTPATIENT
Start: 2024-05-15

## 2024-05-15 RX ORDER — TRETINOIN 0.25 MG/G
GEL TOPICAL AT BEDTIME
Qty: 20 G | Refills: 4 | Status: SHIPPED | OUTPATIENT
Start: 2024-05-15

## 2024-05-15 RX ORDER — LEVONORGESTREL AND ETHINYL ESTRADIOL 90; 20 UG/1; UG/1
1 TABLET ORAL DAILY
Qty: 112 TABLET | Refills: 3 | Status: SHIPPED | OUTPATIENT
Start: 2024-05-15

## 2024-05-15 SDOH — HEALTH STABILITY: PHYSICAL HEALTH: ON AVERAGE, HOW MANY MINUTES DO YOU ENGAGE IN EXERCISE AT THIS LEVEL?: 40 MIN

## 2024-05-15 SDOH — HEALTH STABILITY: PHYSICAL HEALTH: ON AVERAGE, HOW MANY DAYS PER WEEK DO YOU ENGAGE IN MODERATE TO STRENUOUS EXERCISE (LIKE A BRISK WALK)?: 1 DAY

## 2024-05-15 ASSESSMENT — PATIENT HEALTH QUESTIONNAIRE - PHQ9
10. IF YOU CHECKED OFF ANY PROBLEMS, HOW DIFFICULT HAVE THESE PROBLEMS MADE IT FOR YOU TO DO YOUR WORK, TAKE CARE OF THINGS AT HOME, OR GET ALONG WITH OTHER PEOPLE: NOT DIFFICULT AT ALL
SUM OF ALL RESPONSES TO PHQ QUESTIONS 1-9: 8
SUM OF ALL RESPONSES TO PHQ QUESTIONS 1-9: 8

## 2024-05-15 ASSESSMENT — SOCIAL DETERMINANTS OF HEALTH (SDOH): HOW OFTEN DO YOU GET TOGETHER WITH FRIENDS OR RELATIVES?: ONCE A WEEK

## 2024-05-15 NOTE — PROGRESS NOTES
ASSESSMENT/PLAN                                                       (Z00.00) Routine history and physical examination of adult  (primary encounter diagnosis)  Comment: PMH, PSH, FH, SH, medications, allergies, immunizations, and preventative health measures reviewed and updated as appropriate.  Plan: see below for plans.      (F33.42) Recurrent major depressive disorder, in full remission (H24)  (F41.1) JESSIE (generalized anxiety disorder)  Comment: well-controlled on current regimen.    Plan: continue present management; refills provided.     (L70.0) Acne vulgaris  Comment: well-controlled on current regimen.    Plan: continue present management; refills provided.     (Z30.41) Encounter for birth control pills maintenance  Plan: refills of OCP provided.     Rosa Goodman MD   [address] [address]  T: [phone], F: [phone]    SUBJECTIVE                                                      Cristel Irving is a very pleasant 29 year old female who presents for a physical.    ROS:  Constitutional: no unintentional weight loss or gain reported; no fevers, chills, or sweats reported  Cardiovascular: no chest pain, palpitations, or edema reported  Respiratory: no cough, wheezing, shortness of breath, or dyspnea on exertion reported  Gastrointestinal: no nausea, vomiting, constipation, diarrhea, or abdominal pain reported  Genitourinary: no urinary frequency, urgency, dysuria, or hematuria reported  Integumentary: no rash or pruritus reported  Musculoskeletal: no back pain, muscle pain, joint pain, or joint swelling reported  Neurologic: no focal weakness, numbness, or tingling reported  Hematologic: no easy bruising or bleeding reported  Endocrine: no heat or cold intolerance reported; no polyuria or polydipsia reported  Psychiatric: see PMH below    Past Medical History:   Diagnosis Date    JESSIE (generalized anxiety disorder)     MDD (major depressive disorder)       Past Surgical History:   Procedure Laterality Date    RHINOPLASTY  2022     Family History   Problem Relation Age of Onset    Anxiety Disorder Mother     Depression Father     Depression Paternal Grandmother     Diabetes No family hx of     Cerebrovascular Disease No family hx of     Coronary Artery Disease Early Onset No family hx of     Breast Cancer No family hx of     Ovarian Cancer No family hx of     Colon Cancer No family hx of      Social History     Occupational History    Occupation: Nursing School   Tobacco Use    Smoking status: Never    Smokeless tobacco: Never   Vaping Use    Vaping status: Never Used   Substance and Sexual Activity    Alcohol use: No    Drug use: No    Sexual activity: Never   Social History Narrative    Single.    No kids.    Works out when able.      No Known Allergies    Current Outpatient Medications   Medication Sig    busPIRone (BUSPAR) 10 MG tablet Take 1 tablet (10 mg) by mouth daily    EPINEPHrine (ANY BX GENERIC EQUIV) 0.3 MG/0.3ML injection 2-pack Inject 0.3 mLs (0.3 mg) into the muscle as needed for anaphylaxis (Per allergy immunotherapy patient protocol.) May repeat one time in 5-15 minutes if response to initial dose is inadequate.    ipratropium-albuterol (COMBIVENT RESPIMAT)  MCG/ACT inhaler Inhale 1 puff into the lungs 4 times daily as needed for shortness of breath, wheezing or cough    levonorgestrel-ethinyl estrad (TORI) 90-20 MCG tablet Take 1 tablet by mouth daily    ORDER FOR ALLERGEN IMMUNOTHERAPY Name of Mix: Mix #1  Cat, Dog  Cat Hair, Standardized A.P. 10,000 BAU/mL, HS  2.0 ml  UF Dog Hair-Dander- 1:650 w/v HS  1.0 ml   Diluent: HSA 2 ml to 5ml    semaglutide (OZEMPIC) 2 MG/3ML pen Prescribed by [facility]    sertraline (ZOLOFT) 100 MG tablet Take 1 tablet (100 mg) by mouth daily    tretinoin (RETIN-A) 0.025 % external gel Apply topically at bedtime     Immunization History   Administered Date(s) Administered    COVID-19 Bivalent 12+ (Pfizer)  10/24/2022    COVID-19 MONOVALENT 12+ (Pfizer) 11/15/2021    COVID-19 Vaccine ([place]) 04/09/2021    DTAP (<7y) 1996, 1996, 1996, 05/09/1997, 01/16/2001    HEPA 03/30/2010, 10/08/2010    HIB (PRP-T) 1996, 1996, 1996, 05/09/1997    HPV 04/10/2008, 06/10/2008, 10/10/2008    HepB 1996, 1996, 1996    Influenza (H1N1) 01/16/2010    Influenza Intranasal Vaccine 10/10/2008, 10/08/2010, 09/30/2012, 09/27/2014, 10/14/2015    Influenza Vaccine >6 months,quad, PF 11/15/2021, 09/10/2022, 09/18/2023    Influenza Vaccine, 6+MO IM (QUADRIVALENT W/PRESERVATIVES) 12/16/2018    MMR 05/16/1997, 01/16/2001    Meningococcal (Menomune ) 04/10/2008, 07/21/2014    Poliovirus, inactivated (IPV) 1996, 1996, 1996, 01/16/2001    Rabies Vaccine 05/31/2018, 06/07/2018, 06/21/2018    TDAP Vaccine (Adacel) 04/10/2008, 03/15/2016    Varicella 05/16/1997, 04/10/2008     PREVENTATIVE HEALTH                                                      BMI: overweight  Blood pressure: within normal limits   Breast CA screening: not medically indicated at this time   Cervical CA screening: not medically indicated at this time   Colon CA screening: not medically indicated at this time   Lung CA screening: n/a   Dexa: not medically indicated at this time   Screening cholesterol: not medically indicated at this time   Screening diabetes: not medically indicated at this time   STD testing: not sexually active  Alcohol misuse screening: alcohol use reviewed - no intervention indicated at this time  Immunizations: reviewed; up to date     OBJECTIVE                                                      /66   Pulse 85   Temp 97.8  F (36.6  C) (Temporal)   Wt 93.7 kg (206 lb 8 oz)   SpO2 99%   BMI 28.81 kg/m    Constitutional: well-appearing  Head, Ears, and Eyes: normocephalic; normal external auditory canal and pinna; tympanic membranes visualized and normal; normal lids and  conjunctivae  Neck: supple, symmetric, no thyromegaly or lymphadenopathy  Respiratory: normal respiratory effort; clear to auscultation bilaterally  Cardiovascular: regular rate and rhythm; no edema  Gastrointestinal: soft, non-tender, and non-distended; no organomegaly or masses  Musculoskeletal: normal gait and station  Psych: normal judgment and insight; normal mood and affect; recent and remote memory intact    ---  (Note was completed, in part, with [employer] voice-recognition software. Documentation was reviewed, but some grammatical, spelling, and word errors may remain.)

## 2024-05-21 ENCOUNTER — ALLIED HEALTH/NURSE VISIT (OUTPATIENT)
Dept: ALLERGY | Facility: CLINIC | Age: 29
End: 2024-05-21
Payer: COMMERCIAL

## 2024-05-21 DIAGNOSIS — J30.9 ALLERGIC RHINITIS: ICD-10-CM

## 2024-05-21 DIAGNOSIS — J30.81 ALLERGIC RHINITIS DUE TO ANIMAL (CAT) (DOG) HAIR AND DANDER: Primary | ICD-10-CM

## 2024-05-21 PROCEDURE — 95120 IMMUNOTHERAPY ONE INJECTION: CPT

## 2024-06-18 ENCOUNTER — ALLIED HEALTH/NURSE VISIT (OUTPATIENT)
Dept: ALLERGY | Facility: CLINIC | Age: 29
End: 2024-06-18
Payer: COMMERCIAL

## 2024-06-18 DIAGNOSIS — J30.81 ALLERGIC RHINITIS DUE TO ANIMAL (CAT) (DOG) HAIR AND DANDER: Primary | ICD-10-CM

## 2024-06-18 PROCEDURE — 95120 IMMUNOTHERAPY ONE INJECTION: CPT

## 2024-06-18 NOTE — PROGRESS NOTES
Cristel Irving presents to clinic today at the request of Cezar Crews MD (ordering provider) for Allergy Immunotherapy injection(s).       This service provided today was under the care of Cezar Crews MD; the supervising provider of the day; who was available if needed.      Patient presented after waiting 30 minutes with no reaction to  injections. Discharged from clinic.    Lyndsay Dumont RN

## 2024-07-05 DIAGNOSIS — Z30.41 ENCOUNTER FOR BIRTH CONTROL PILLS MAINTENANCE: ICD-10-CM

## 2024-07-05 RX ORDER — LEVONORGESTREL AND ETHINYL ESTRADIOL 90; 20 UG/1; UG/1
1 TABLET ORAL DAILY
Qty: 112 TABLET | Refills: 3 | OUTPATIENT
Start: 2024-07-05

## 2024-07-05 NOTE — TELEPHONE ENCOUNTER
PER PHARMACY: PLAN DOES NOT COVER THIS MEDICATION. PLEASE CALL PLAN AT [phone] TO INITIATE PRIOR AUTHORIZATION OR CALL/FAX PHARMACY TO CHANGE MEDICATION. PATIENT ID # [id number]

## 2024-07-09 ENCOUNTER — OFFICE VISIT (OUTPATIENT)
Dept: ALLERGY | Facility: CLINIC | Age: 29
End: 2024-07-09
Attending: INTERNAL MEDICINE
Payer: COMMERCIAL

## 2024-07-09 ENCOUNTER — ALLIED HEALTH/NURSE VISIT (OUTPATIENT)
Dept: ALLERGY | Facility: CLINIC | Age: 29
End: 2024-07-09
Payer: COMMERCIAL

## 2024-07-09 ENCOUNTER — TELEPHONE (OUTPATIENT)
Dept: INTERNAL MEDICINE | Facility: CLINIC | Age: 29
End: 2024-07-09

## 2024-07-09 VITALS
DIASTOLIC BLOOD PRESSURE: 65 MMHG | OXYGEN SATURATION: 96 % | WEIGHT: 206.57 LBS | BODY MASS INDEX: 28.82 KG/M2 | SYSTOLIC BLOOD PRESSURE: 94 MMHG | HEART RATE: 74 BPM

## 2024-07-09 DIAGNOSIS — J30.81 ALLERGIC RHINITIS DUE TO ANIMAL (CAT) (DOG) HAIR AND DANDER: Primary | ICD-10-CM

## 2024-07-09 DIAGNOSIS — R06.2 WHEEZING: Primary | ICD-10-CM

## 2024-07-09 DIAGNOSIS — J30.81 ALLERGIC RHINITIS DUE TO ANIMAL (CAT) (DOG) HAIR AND DANDER: ICD-10-CM

## 2024-07-09 PROCEDURE — 99213 OFFICE O/P EST LOW 20 MIN: CPT | Mod: 25 | Performed by: INTERNAL MEDICINE

## 2024-07-09 PROCEDURE — 95120 IMMUNOTHERAPY ONE INJECTION: CPT

## 2024-07-09 NOTE — PROGRESS NOTES
Cristel Irving was seen in the Allergy Clinic at [facility].    Cristel Irving is a 29 year old female being seen today for ongoing evaluation of allergic rhinitis to cat and dog and is receiving allergy immunotherapy.    Since the last visit she continues to have an itchy throat, nasal congestion and sneezing.     She has been on allergy shots for over 1 year.  She is receiving allergy shots to dogs and cats.  She does have 2 cats at home now.     She does recognize that she has had some improvement with cat exposure with the allergy shots however symptoms.  However the improvement with the allergy shots is enough that she would like to continue with the allergy injections.  She did start taking Zyrtec on a daily basis and is getting additional improvement with her symptoms.    She does have exercise-induced wheezing and shortness of breath.  She is using Combivent as needed.  She is found that she does not require this as frequently now with exercise.    Past Medical History:   Diagnosis Date    JESSIE (generalized anxiety disorder)     MDD (major depressive disorder)      Family History   Problem Relation Age of Onset    Anxiety Disorder Mother     Depression Father     Depression Paternal Grandmother     Diabetes No family hx of     Cerebrovascular Disease No family hx of     Coronary Artery Disease Early Onset No family hx of     Breast Cancer No family hx of     Ovarian Cancer No family hx of     Colon Cancer No family hx of      Past Surgical History:   Procedure Laterality Date    RHINOPLASTY  2022         Current Outpatient Medications:     busPIRone (BUSPAR) 10 MG tablet, Take 1 tablet (10 mg) by mouth daily, Disp: 90 tablet, Rfl: 3    EPINEPHrine (ANY BX GENERIC EQUIV) 0.3 MG/0.3ML injection 2-pack, Inject 0.3 mLs (0.3 mg) into the muscle as needed for anaphylaxis (Per allergy immunotherapy patient protocol.) May repeat one time in 5-15 minutes if response to initial dose is  inadequate., Disp: 2 each, Rfl: 1    ipratropium-albuterol (COMBIVENT RESPIMAT)  MCG/ACT inhaler, Inhale 1 puff into the lungs 4 times daily as needed for shortness of breath, wheezing or cough, Disp: 4 g, Rfl: 1    levonorgestrel-ethinyl estrad (TORI) 90-20 MCG tablet, Take 1 tablet by mouth daily, Disp: 112 tablet, Rfl: 3    ORDER FOR ALLERGEN IMMUNOTHERAPY, Name of Mix: Mix #1  Cat, Dog Cat Hair, Standardized A.P. 10,000 BAU/mL, HS  2.0 ml UF Dog Hair-Dander- 1:650 w/v HS  1.0 ml  Diluent: HSA 2 ml to 5ml, Disp: 5 mL, Rfl: PRN    semaglutide (OZEMPIC) 2 MG/3ML pen, Prescribed by [facility], Disp: , Rfl:     sertraline (ZOLOFT) 100 MG tablet, Take 1 tablet (100 mg) by mouth daily, Disp: 90 tablet, Rfl: 3    tretinoin (RETIN-A) 0.025 % external gel, Apply topically at bedtime, Disp: 20 g, Rfl: 4  No Known Allergies      EXAM:   BP 94/65   Pulse 74   Wt 93.7 kg (206 lb 9.1 oz)   SpO2 96%   BMI 28.82 kg/m      Constitutional:       General: She is not in acute distress.     Appearance: Normal appearance. She is not ill-appearing.   HENT:      Head: Normocephalic and atraumatic.      Nose: Mild turbinate hypertrophy bilaterally     Mouth/Throat:      Mouth: Mucous membranes are moist.      Pharynx: Oropharynx is clear. No posterior oropharyngeal erythema.   Eyes:      General:         Right eye: No discharge.         Left eye: No discharge.   Cardiovascular:      Rate and Rhythm: Normal rate and regular rhythm.      Heart sounds: Normal heart sounds.   Pulmonary:      Effort: Pulmonary effort is normal.      Breath sounds: Normal breath sounds. No wheezing or rhonchi.   Skin:     General: Skin is warm.      Findings: No erythema or rash.   Neurological:      General: No focal deficit present.      Mental Status: She is alert. Mental status is at baseline.   Psychiatric:         Mood and Affect: Mood normal.         Behavior: Behavior normal.        ASSESSMENT/PLAN:  Cristel Irving is a 29 year old  female seen today for ongoing evaluation of allergic rhinitis to dog and cat.  She does have cats.  She also has exercise-induced asthma and is not requiring Combivent as frequently at this time.  She is receiving allergy immunotherapy.  She would like the allergy shots to continue.  Total duration will be approximately 4 years.    Combivent, 1 puff 15 minute prior to exercise.   Will continue allergy shots.  Zyrtec 10 mg at night      Follow-up in 1 year      Thank you for allowing me to participate in the care of Cristel Irving.      I spent 25 minutes on the date of the encounter doing chart review, history and exam, documentation and further coordination as noted above exclusive of separately reported interpretations    Cezar Crews MD  Allergy/Immunology  [facility]

## 2024-07-09 NOTE — LETTER
7/9/2024      Cristel Irving  [address]  [facility] [address]      Dear Colleague,    Thank you for referring your patient, Cristel Irving, to the [facility] SPECIALTY [facility]. Please see a copy of my visit note below.    Cristel Irving was seen in the Allergy Clinic at [facility].    Cristel Irving is a 29 year old female being seen today for ongoing evaluation of allergic rhinitis to cat and dog and is receiving allergy immunotherapy.    Since the last visit she continues to have an itchy throat, nasal congestion and sneezing.     She has been on allergy shots for over 1 year.  She is receiving allergy shots to dogs and cats.  She does have 2 cats at home now.     She does recognize that she has had some improvement with cat exposure with the allergy shots however symptoms.  However the improvement with the allergy shots is enough that she would like to continue with the allergy injections.  She did start taking Zyrtec on a daily basis and is getting additional improvement with her symptoms.    She does have exercise-induced wheezing and shortness of breath.  She is using Combivent as needed.  She is found that she does not require this as frequently now with exercise.    Past Medical History:   Diagnosis Date     JESSIE (generalized anxiety disorder)      MDD (major depressive disorder)      Family History   Problem Relation Age of Onset     Anxiety Disorder Mother      Depression Father      Depression Paternal Grandmother      Diabetes No family hx of      Cerebrovascular Disease No family hx of      Coronary Artery Disease Early Onset No family hx of      Breast Cancer No family hx of      Ovarian Cancer No family hx of      Colon Cancer No family hx of      Past Surgical History:   Procedure Laterality Date     RHINOPLASTY  2022         Current Outpatient Medications:      busPIRone (BUSPAR) 10 MG tablet, Take 1 tablet (10 mg) by mouth daily,  Disp: 90 tablet, Rfl: 3     EPINEPHrine (ANY BX GENERIC EQUIV) 0.3 MG/0.3ML injection 2-pack, Inject 0.3 mLs (0.3 mg) into the muscle as needed for anaphylaxis (Per allergy immunotherapy patient protocol.) May repeat one time in 5-15 minutes if response to initial dose is inadequate., Disp: 2 each, Rfl: 1     ipratropium-albuterol (COMBIVENT RESPIMAT)  MCG/ACT inhaler, Inhale 1 puff into the lungs 4 times daily as needed for shortness of breath, wheezing or cough, Disp: 4 g, Rfl: 1     levonorgestrel-ethinyl estrad (TORI) 90-20 MCG tablet, Take 1 tablet by mouth daily, Disp: 112 tablet, Rfl: 3     ORDER FOR ALLERGEN IMMUNOTHERAPY, Name of Mix: Mix #1  Cat, Dog Cat Hair, Standardized A.P. 10,000 BAU/mL, HS  2.0 ml UF Dog Hair-Dander- 1:650 w/v HS  1.0 ml  Diluent: HSA 2 ml to 5ml, Disp: 5 mL, Rfl: PRN     semaglutide (OZEMPIC) 2 MG/3ML pen, Prescribed by [facility], Disp: , Rfl:      sertraline (ZOLOFT) 100 MG tablet, Take 1 tablet (100 mg) by mouth daily, Disp: 90 tablet, Rfl: 3     tretinoin (RETIN-A) 0.025 % external gel, Apply topically at bedtime, Disp: 20 g, Rfl: 4  No Known Allergies      EXAM:   BP 94/65   Pulse 74   Wt 93.7 kg (206 lb 9.1 oz)   SpO2 96%   BMI 28.82 kg/m      Constitutional:       General: She is not in acute distress.     Appearance: Normal appearance. She is not ill-appearing.   HENT:      Head: Normocephalic and atraumatic.      Nose: Mild turbinate hypertrophy bilaterally     Mouth/Throat:      Mouth: Mucous membranes are moist.      Pharynx: Oropharynx is clear. No posterior oropharyngeal erythema.   Eyes:      General:         Right eye: No discharge.         Left eye: No discharge.   Cardiovascular:      Rate and Rhythm: Normal rate and regular rhythm.      Heart sounds: Normal heart sounds.   Pulmonary:      Effort: Pulmonary effort is normal.      Breath sounds: Normal breath sounds. No wheezing or rhonchi.   Skin:     General: Skin is warm.      Findings: No erythema or  rash.   Neurological:      General: No focal deficit present.      Mental Status: She is alert. Mental status is at baseline.   Psychiatric:         Mood and Affect: Mood normal.         Behavior: Behavior normal.        ASSESSMENT/PLAN:  Cristel Irving is a 29 year old female seen today for ongoing evaluation of allergic rhinitis to dog and cat.  She does have cats.  She also has exercise-induced asthma and is not requiring Combivent as frequently at this time.  She is receiving allergy immunotherapy.  She would like the allergy shots to continue.  Total duration will be approximately 4 years.    Combivent, 1 puff 15 minute prior to exercise.   Will continue allergy shots.  Zyrtec 10 mg at night      Follow-up in 1 year      Thank you for allowing me to participate in the care of Cristel Irving.      I spent 25 minutes on the date of the encounter doing chart review, history and exam, documentation and further coordination as noted above exclusive of separately reported interpretations    Cezar Crews MD  Allergy/Immunology  [facility]      Again, thank you for allowing me to participate in the care of your patient.        Sincerely,        Cezar Crews MD

## 2024-07-09 NOTE — TELEPHONE ENCOUNTER
Prior Authorization Retail Medication Request    Medication/Dose: faizan  Diagnosis and ICD code (if different than what is on RX):    New/renewal/insurance change PA/secondary ins. PA:  Previously Tried and Failed:    Rationale:      Insurance   Primary:   Insurance ID:      Secondary (if applicable):  Insurance ID:      Pharmacy Information (if different than what is on RX)  Name:  [facility]  Phone:  [phone]  Fax:[phone]

## 2024-07-09 NOTE — TELEPHONE ENCOUNTER
ALLERGY SOLUTION RE-ORDER REQUEST    Cristel Irving 1995 MRN: [id number]    DATE NEEDED:  8/2/2024  Vial Color Content    Vial Size  Red 1:1 Cat, Dog    5 mL      Serum reorder consent signed and patient/parent was advised that new serums would be ordered through the pharmacy and billed to their insurance company when they arrive in clinic. Yes    Shot Clinic Location:  [facility].  Ship to Location: [facility].  Serum billed to:  [facility].    Special Instructions:  N/A        Requester Signature  Dara Mercado RN

## 2024-07-09 NOTE — PATIENT INSTRUCTIONS
Allergy Staff Appt Hours Shot Hours Location       Physician   Cezar Crews MD      Support Staff   AMBREEN Hernandez RN, MA Emily J., MA      Mondays Tuesdays Thursdays and Fridays:      [place]a [place] 7-5      Wednesdays         Close                Mondays, Tuesdays and Fridays:  7:20 - 3:40              [facility]  [address]  [place] [address]  Allergy appointment  line: [phone]    Pulmonary Function Scheduling:  [place]: [phone]           Questions about cost of your care  For questions about your cost of your visit, procedure, lab or imaging contact: Cards Off Line [phone] or visit:  [email or web address]    Prescription Assistance  If you need assistance with your prescriptions (cost, coverage, etc) please contact: [employer] Prescription Assistance Program [phone]    Important Scheduling Information  All visits for food challenges, medication/drug allergy testing, and drug challenges MUST be scheduled through the allergy clinic nurse. Please contact them via [employer] or by calling the clinic at [phone] and asking to speak with an allergy nurse. They will provide additional information and instructions for the appointment. Discontinue oral antihistamines 7 days prior to the appointment. Discontinue nasal and ocular antihistamines 1 day prior to the appointment.    Appointments for skin testing: Appointment will last approximately 45 minutes.  Please call the appointment line for your clinic to schedule.  Discontinue oral antihistamines 7 days prior to the appointment.  Discontinue nasal and ocular antihistamines 1 days prior to appointment.    Thank you for trusting us with your care. Please feel free to contact us with any questions or concerns you may have.

## 2024-07-11 ENCOUNTER — TELEPHONE (OUTPATIENT)
Dept: ALLERGY | Facility: CLINIC | Age: 29
End: 2024-07-11
Payer: COMMERCIAL

## 2024-07-11 DIAGNOSIS — J30.81 ALLERGIC RHINITIS DUE TO ANIMAL (CAT) (DOG) HAIR AND DANDER: Primary | ICD-10-CM

## 2024-07-11 PROCEDURE — 95165 ANTIGEN THERAPY SERVICES: CPT | Performed by: INTERNAL MEDICINE

## 2024-07-11 NOTE — PROGRESS NOTES
Allergy serums billed to [facility].     Vials billed below:    Vial Color Content                      Vial Size Expiration Date  Red 1:1 Cat, Dog 5mL  7/11/25      Billed 10 units    Checked by Enrique Oglesby / LPN        Signature  Enrique Oglesby LPN

## 2024-07-12 NOTE — TELEPHONE ENCOUNTER
PA Initiation    Medication: LEVONORGESTREL-ETHINYL ESTRAD 90-20 MCG PO TABS  Insurance Company: Sheri ([facility]) - Phone [phone] Fax [phone]  Pharmacy Filling the Rx: [facility][id number] [place] [address] AT [facility] [address]  Filling Pharmacy Phone: [phone]  Filling Pharmacy Fax:    Start Date: 7/12/2024

## 2024-07-16 NOTE — TELEPHONE ENCOUNTER
Prior Authorization Approval    Medication: LEVONORGESTREL-ETHINYL ESTRAD 90-20 MCG PO TABS  Authorization Effective Date: 7/12/2024  Authorization Expiration Date: 7/12/2025  Approved Dose/Quantity: 112/84  Reference #: [id number]   Insurance Company: Sheri ([facility]) - Phone [phone] Fax [phone]  Expected CoPay: $    CoPay Card Available:      Financial Assistance Needed:   Which Pharmacy is filling the prescription: [id number] - [facility] [address] AT [facility] OF [facility] [address]  Pharmacy Notified: Yes  Patient Notified: Yes

## 2024-07-30 NOTE — PROGRESS NOTES
Allergy serums received at [facility].    Vials received below:    Vial Color Content                      Vial Size Expiration Date  Red 1:1 Cat, Dog 5mL  7/11/2025            Signature  Cassie Robertson MA

## 2024-08-06 ENCOUNTER — ALLIED HEALTH/NURSE VISIT (OUTPATIENT)
Dept: ALLERGY | Facility: CLINIC | Age: 29
End: 2024-08-06
Payer: COMMERCIAL

## 2024-08-06 DIAGNOSIS — J30.81 ALLERGIC RHINITIS DUE TO ANIMAL (CAT) (DOG) HAIR AND DANDER: Primary | ICD-10-CM

## 2024-08-06 PROCEDURE — 95120 IMMUNOTHERAPY ONE INJECTION: CPT

## 2024-08-09 ENCOUNTER — ALLIED HEALTH/NURSE VISIT (OUTPATIENT)
Dept: ALLERGY | Facility: CLINIC | Age: 29
End: 2024-08-09
Payer: COMMERCIAL

## 2024-08-09 DIAGNOSIS — J30.81 ALLERGIC RHINITIS DUE TO ANIMAL (CAT) (DOG) HAIR AND DANDER: Primary | ICD-10-CM

## 2024-08-09 PROCEDURE — 95120 IMMUNOTHERAPY ONE INJECTION: CPT

## 2024-08-12 ENCOUNTER — ALLIED HEALTH/NURSE VISIT (OUTPATIENT)
Dept: ALLERGY | Facility: CLINIC | Age: 29
End: 2024-08-12
Payer: COMMERCIAL

## 2024-08-12 DIAGNOSIS — J30.81 ALLERGIC RHINITIS DUE TO ANIMAL (CAT) (DOG) HAIR AND DANDER: Primary | ICD-10-CM

## 2024-08-12 PROCEDURE — 95120 IMMUNOTHERAPY ONE INJECTION: CPT

## 2024-09-10 ENCOUNTER — ALLIED HEALTH/NURSE VISIT (OUTPATIENT)
Dept: ALLERGY | Facility: CLINIC | Age: 29
End: 2024-09-10
Payer: COMMERCIAL

## 2024-09-10 DIAGNOSIS — J30.81 ALLERGIC RHINITIS DUE TO ANIMAL (CAT) (DOG) HAIR AND DANDER: Primary | ICD-10-CM

## 2024-09-10 PROCEDURE — 95120 IMMUNOTHERAPY ONE INJECTION: CPT

## 2024-10-14 ENCOUNTER — ALLIED HEALTH/NURSE VISIT (OUTPATIENT)
Dept: ALLERGY | Facility: CLINIC | Age: 29
End: 2024-10-14
Payer: COMMERCIAL

## 2024-10-14 DIAGNOSIS — J30.81 ALLERGIC RHINITIS DUE TO ANIMAL (CAT) (DOG) HAIR AND DANDER: Primary | ICD-10-CM

## 2024-10-14 PROCEDURE — 95120 IMMUNOTHERAPY ONE INJECTION: CPT

## 2024-11-11 ENCOUNTER — ALLIED HEALTH/NURSE VISIT (OUTPATIENT)
Dept: ALLERGY | Facility: CLINIC | Age: 29
End: 2024-11-11
Payer: COMMERCIAL

## 2024-11-11 DIAGNOSIS — J30.81 ALLERGIC RHINITIS DUE TO ANIMAL (CAT) (DOG) HAIR AND DANDER: Primary | ICD-10-CM

## 2024-11-11 PROCEDURE — 95120 IMMUNOTHERAPY ONE INJECTION: CPT

## 2025-01-30 ENCOUNTER — LAB REQUISITION (OUTPATIENT)
Dept: LAB | Facility: CLINIC | Age: 30
End: 2025-01-30
Payer: COMMERCIAL

## 2025-01-30 DIAGNOSIS — L70.0 ACNE VULGARIS: ICD-10-CM

## 2025-01-30 DIAGNOSIS — Z79.899 OTHER LONG TERM (CURRENT) DRUG THERAPY: ICD-10-CM

## 2025-01-30 LAB — POTASSIUM SERPL-SCNC: 4.4 MMOL/L (ref 3.4–5.3)

## 2025-01-30 PROCEDURE — 84132 ASSAY OF SERUM POTASSIUM: CPT | Mod: ORL | Performed by: DERMATOLOGY

## 2025-02-11 ENCOUNTER — ALLIED HEALTH/NURSE VISIT (OUTPATIENT)
Dept: ALLERGY | Facility: CLINIC | Age: 30
End: 2025-02-11
Payer: COMMERCIAL

## 2025-02-11 DIAGNOSIS — J30.81 ALLERGIC RHINITIS DUE TO ANIMAL (CAT) (DOG) HAIR AND DANDER: Primary | ICD-10-CM

## 2025-02-11 NOTE — PROGRESS NOTES
Cristel Irving presents to clinic today at the request of Cezar Crews MD (ordering provider) for Allergy Immunotherapy injection(s).       This service provided today was under the care of Bob White MD; the supervising provider of the day; who was available if needed.      Patient presented after waiting 30 minutes with no reaction to  injections. Discharged from clinic.    Lyndsay Dumont RN

## 2025-02-18 DIAGNOSIS — J30.81 ALLERGIC RHINITIS DUE TO ANIMAL (CAT) (DOG) HAIR AND DANDER: ICD-10-CM

## 2025-02-18 DIAGNOSIS — F33.42 RECURRENT MAJOR DEPRESSIVE DISORDER, IN FULL REMISSION: ICD-10-CM

## 2025-02-18 RX ORDER — SERTRALINE HYDROCHLORIDE 100 MG/1
100 TABLET, FILM COATED ORAL DAILY
Qty: 90 TABLET | Refills: 3 | OUTPATIENT
Start: 2025-02-18

## 2025-02-18 NOTE — TELEPHONE ENCOUNTER
ALLERGY SOLUTION RE-ORDER REQUEST    Cristel Irving 1995 MRN: [id number]    DATE NEEDED:  03/07/2025  Vial Color Content    Vial Size  Red 1:1 Cat, Dog    5 mL    Serum reorder consent signed and patient/parent was advised that new serums would be ordered through the pharmacy and billed to their insurance company when they arrive in clinic. Yes    Shot Clinic Location:  [facility].  Ship to Location: [facility].  Serum billed to:  [facility].    Special Instructions:  N/A        Requester Signature  Lyndsay Dumont RN

## 2025-02-20 DIAGNOSIS — J30.81 ALLERGIC RHINITIS DUE TO ANIMAL (CAT) (DOG) HAIR AND DANDER: Primary | ICD-10-CM

## 2025-02-20 NOTE — PROGRESS NOTES
Allergy serums billed to [facility].     Vials billed below:    Vial Color Content                      Vial Size Expiration Date  Red 1:1 Cat, Dog 5mL  2/20/26      Billed 10 units    Checked by Enrique Oglesby / LPN        Signature  Enrique Oglesby LPN     
01-Nov-2021

## 2025-03-17 ENCOUNTER — ALLIED HEALTH/NURSE VISIT (OUTPATIENT)
Dept: ALLERGY | Facility: CLINIC | Age: 30
End: 2025-03-17
Payer: COMMERCIAL

## 2025-03-17 DIAGNOSIS — J30.81 ALLERGIC RHINITIS DUE TO ANIMAL (CAT) (DOG) HAIR AND DANDER: Primary | ICD-10-CM

## 2025-03-17 PROCEDURE — 95115 IMMUNOTHERAPY ONE INJECTION: CPT

## 2025-03-31 ENCOUNTER — ALLIED HEALTH/NURSE VISIT (OUTPATIENT)
Dept: ALLERGY | Facility: CLINIC | Age: 30
End: 2025-03-31
Payer: COMMERCIAL

## 2025-03-31 DIAGNOSIS — J30.81 ALLERGIC RHINITIS DUE TO ANIMAL (CAT) (DOG) HAIR AND DANDER: Primary | ICD-10-CM

## 2025-03-31 PROCEDURE — 95115 IMMUNOTHERAPY ONE INJECTION: CPT

## 2025-04-07 ENCOUNTER — ALLIED HEALTH/NURSE VISIT (OUTPATIENT)
Dept: ALLERGY | Facility: CLINIC | Age: 30
End: 2025-04-07
Payer: COMMERCIAL

## 2025-04-07 DIAGNOSIS — J30.81 ALLERGIC RHINITIS DUE TO ANIMAL (CAT) (DOG) HAIR AND DANDER: Primary | ICD-10-CM

## 2025-04-07 PROCEDURE — 95115 IMMUNOTHERAPY ONE INJECTION: CPT

## 2025-04-17 DIAGNOSIS — F33.42 RECURRENT MAJOR DEPRESSIVE DISORDER, IN FULL REMISSION: ICD-10-CM

## 2025-04-17 DIAGNOSIS — Z30.41 ENCOUNTER FOR BIRTH CONTROL PILLS MAINTENANCE: ICD-10-CM

## 2025-04-17 DIAGNOSIS — F41.1 GAD (GENERALIZED ANXIETY DISORDER): ICD-10-CM

## 2025-04-17 RX ORDER — LEVONORGESTREL AND ETHINYL ESTRADIOL 90; 20 UG/1; UG/1
1 TABLET ORAL DAILY
Qty: 112 TABLET | Refills: 0 | Status: SHIPPED | OUTPATIENT
Start: 2025-04-17

## 2025-04-17 RX ORDER — BUSPIRONE HYDROCHLORIDE 10 MG/1
10 TABLET ORAL DAILY
Qty: 90 TABLET | Refills: 3 | Status: SHIPPED | OUTPATIENT
Start: 2025-04-17

## 2025-04-17 RX ORDER — SERTRALINE HYDROCHLORIDE 100 MG/1
100 TABLET, FILM COATED ORAL DAILY
Qty: 90 TABLET | Refills: 3 | Status: SHIPPED | OUTPATIENT
Start: 2025-04-17

## 2025-05-05 ENCOUNTER — ALLIED HEALTH/NURSE VISIT (OUTPATIENT)
Dept: ALLERGY | Facility: CLINIC | Age: 30
End: 2025-05-05
Payer: COMMERCIAL

## 2025-05-05 DIAGNOSIS — J30.81 ALLERGIC RHINITIS DUE TO ANIMAL (CAT) (DOG) HAIR AND DANDER: Primary | ICD-10-CM

## 2025-05-05 PROCEDURE — 95115 IMMUNOTHERAPY ONE INJECTION: CPT

## 2025-07-07 ENCOUNTER — ALLIED HEALTH/NURSE VISIT (OUTPATIENT)
Dept: ALLERGY | Facility: CLINIC | Age: 30
End: 2025-07-07
Payer: COMMERCIAL

## 2025-07-07 DIAGNOSIS — J30.81 ALLERGIC RHINITIS DUE TO ANIMAL (CAT) (DOG) HAIR AND DANDER: Primary | ICD-10-CM

## 2025-07-07 PROCEDURE — 95115 IMMUNOTHERAPY ONE INJECTION: CPT

## 2025-07-10 ENCOUNTER — OFFICE VISIT (OUTPATIENT)
Dept: ALLERGY | Facility: CLINIC | Age: 30
End: 2025-07-10
Attending: INTERNAL MEDICINE
Payer: COMMERCIAL

## 2025-07-10 VITALS
OXYGEN SATURATION: 99 % | SYSTOLIC BLOOD PRESSURE: 96 MMHG | DIASTOLIC BLOOD PRESSURE: 65 MMHG | WEIGHT: 143.2 LBS | BODY MASS INDEX: 19.98 KG/M2 | HEART RATE: 79 BPM

## 2025-07-10 DIAGNOSIS — J30.81 ALLERGIC RHINITIS DUE TO ANIMAL (CAT) (DOG) HAIR AND DANDER: ICD-10-CM

## 2025-07-10 DIAGNOSIS — R06.2 WHEEZING: Primary | ICD-10-CM

## 2025-07-10 NOTE — PATIENT INSTRUCTIONS
Allergy Staff Appt Hours Shot Hours Location       Physician   Cezar Crews MD      Support Staff   AMBREEN Hernandez RN, MA Emily J., MA      Mondays Tuesdays Thursdays and Fridays:      [place] 7-5      Wednesdays         Close                Mondays, Tuesdays and Fridays:  7:20 - 3:40              [facility]  [address]  [place] [address]  Allergy appointment  line: [phone]    Pulmonary Function Scheduling:  [place]: [phone]           Questions about cost of your care  For questions about your cost of your visit, procedure, lab or imaging contact: [employer] Line [phone] or visit:  [email or web address]    Prescription Assistance  If you need assistance with your prescriptions (cost, coverage, etc) please contact: [employer] Prescription Assistance Program [phone]    Important Scheduling Information  All visits for food challenges, medication/drug allergy testing, and drug challenges MUST be scheduled through the allergy clinic nurse. Please contact them via [employer] or by calling the clinic at [phone] and asking to speak with an allergy nurse. They will provide additional information and instructions for the appointment. Discontinue oral antihistamines 7 days prior to the appointment. Discontinue nasal and ocular antihistamines 1 day prior to the appointment.    Appointments for skin testing: Appointment will last approximately 45 minutes.  Please call the appointment line for your clinic to schedule.  Discontinue oral antihistamines 7 days prior to the appointment.  Discontinue nasal and ocular antihistamines 1 days prior to appointment.    Thank you for trusting us with your care. Please feel free to contact us with any questions or concerns you may have.

## 2025-07-10 NOTE — PROGRESS NOTES
Cristel Irving was seen in the Allergy Clinic at [facility].    Cristel Irving is a 30 year old female being seen today for ongoing evaluation of allergic rhinitis and is receiving Allergy immunotherapy.    She has been on allergy shots for almost 3 years, which were started August 2022.  She is receiving allergy shots to dogs and cats.  She does have 2 cats at home.     She has improvement with allergy shots and would like to continue with the allergy injections.  She did start taking Zyrtec on a daily basis.  When she stopped Zyrtec for a week, she did have itching of her skin but she did not have any allergy symptoms.  She does recognize that the allergy shots have resulted in a nice improvement with her Allergy.    She does have exercise-induced wheezing and shortness of breath.  She is using Combivent as needed.  She uses Combivent prior to exercise.    Since the last visit the patient has been good.    Past Medical History:   Diagnosis Date    JESSIE (generalized anxiety disorder)     MDD (major depressive disorder)      Family History   Problem Relation Age of Onset    Anxiety Disorder Mother     Depression Father     Depression Paternal Grandmother     Diabetes No family hx of     Cerebrovascular Disease No family hx of     Coronary Artery Disease Early Onset No family hx of     Breast Cancer No family hx of     Ovarian Cancer No family hx of     Colon Cancer No family hx of      Past Surgical History:   Procedure Laterality Date    RHINOPLASTY  2022         Current Outpatient Medications:     busPIRone (BUSPAR) 10 MG tablet, Take 1 tablet (10 mg) by mouth daily., Disp: 90 tablet, Rfl: 3    EPINEPHrine (ANY BX GENERIC EQUIV) 0.3 MG/0.3ML injection 2-pack, Inject 0.3 mLs (0.3 mg) into the muscle as needed for anaphylaxis (Per allergy immunotherapy patient protocol.) May repeat one time in 5-15 minutes if response to initial dose is inadequate., Disp: 2 each, Rfl: 1     ipratropium-albuterol (COMBIVENT RESPIMAT)  MCG/ACT inhaler, Inhale 1 puff into the lungs 4 times daily as needed for shortness of breath, wheezing or cough, Disp: 4 g, Rfl: 1    levonorgestrel-ethinyl estrad (TORI) 90-20 MCG tablet, Take 1 tablet by mouth daily., Disp: 112 tablet, Rfl: 0    semaglutide (OZEMPIC) 2 MG/3ML pen, Prescribed by [facility] (Patient taking differently: Inject subcutaneously every 7 days. Prescribed by [facility]), Disp: , Rfl:     sertraline (ZOLOFT) 100 MG tablet, Take 1 tablet (100 mg) by mouth daily., Disp: 90 tablet, Rfl: 3    tretinoin (RETIN-A) 0.025 % external gel, Apply topically at bedtime, Disp: 20 g, Rfl: 4    ORDER FOR ALLERGEN IMMUNOTHERAPY, Name of Mix: Mix #1  Cat, Dog Cat Hair, Standardized A.P. 10,000 BAU/mL, HS  2.0 ml UF Dog Hair-Dander- 1:650 w/v HS  1.0 ml  Diluent: HSA 2 ml to 5ml, Disp: , Rfl:   No Known Allergies      EXAM:   BP 96/65 (BP Location: Left arm, Patient Position: Sitting, Cuff Size: Adult Regular)   Pulse 79   Wt 65 kg (143 lb 3.2 oz)   SpO2 99%   BMI 19.98 kg/m      Constitutional:       General: She is not in acute distress.     Appearance: Normal appearance. She is not ill-appearing.   HENT:      Head: Normocephalic and atraumatic.      Nose: Nose normal. No congestion or rhinorrhea.      Mouth/Throat:      Mouth: Mucous membranes are moist.      Pharynx: Oropharynx is clear. No posterior oropharyngeal erythema.   Eyes:      General:         Right eye: No discharge.         Left eye: No discharge.   Cardiovascular:      Rate and Rhythm: Normal rate and regular rhythm.      Heart sounds: Normal heart sounds.   Pulmonary:      Effort: Pulmonary effort is normal.      Breath sounds: Normal breath sounds. No wheezing or rhonchi.   Skin:     General: Skin is warm.      Findings: No erythema or rash.   Neurological:      General: No focal deficit present.      Mental Status: She is alert. Mental status is at baseline.   Psychiatric:         Mood and  Affect: Mood normal.        ASSESSMENT/PLAN:  Cristel Irving is a 30 year old female seen today for allergy evaluation.  She also has exercise-induced asthma.    Combivent, 1 puff 15 minute prior to exercise.   Will continue allergy shots.  Allergy shots will be discontinued summer 2026.  Zyrtec 10 mg at night    Follow-up in 1 year      Thank you for allowing me to participate in the care of Cristel Irving.      I spent 20 minutes on the date of the encounter doing chart review, history and exam, documentation and further coordination as noted above exclusive of separately reported interpretations    Cezar Crews MD  Allergy/Immunology  [facility]

## 2025-07-10 NOTE — LETTER
7/10/2025      Cristel Irving  [address]  [facility] [address]      Dear Colleague,    Thank you for referring your patient, Cristel Irving, to the [facility] SPECIALTY [facility]. Please see a copy of my visit note below.    Cristel Irving was seen in the Allergy Clinic at [facility].    Cristel Irving is a 30 year old female being seen today for ongoing evaluation of allergic rhinitis and is receiving Allergy immunotherapy.    She has been on allergy shots for almost 3 years, which were started August 2022.  She is receiving allergy shots to dogs and cats.  She does have 2 cats at home.     She has improvement with allergy shots and would like to continue with the allergy injections.  She did start taking Zyrtec on a daily basis.  When she stopped Zyrtec for a week, she did have itching of her skin but she did not have any allergy symptoms.  She does recognize that the allergy shots have resulted in a nice improvement with her Allergy.    She does have exercise-induced wheezing and shortness of breath.  She is using Combivent as needed.  She uses Combivent prior to exercise.    Since the last visit the patient has been good.    Past Medical History:   Diagnosis Date     JESSIE (generalized anxiety disorder)      MDD (major depressive disorder)      Family History   Problem Relation Age of Onset     Anxiety Disorder Mother      Depression Father      Depression Paternal Grandmother      Diabetes No family hx of      Cerebrovascular Disease No family hx of      Coronary Artery Disease Early Onset No family hx of      Breast Cancer No family hx of      Ovarian Cancer No family hx of      Colon Cancer No family hx of      Past Surgical History:   Procedure Laterality Date     RHINOPLASTY  2022         Current Outpatient Medications:      busPIRone (BUSPAR) 10 MG tablet, Take 1 tablet (10 mg) by mouth daily., Disp: 90 tablet, Rfl: 3     EPINEPHrine (ANY BX  GENERIC EQUIV) 0.3 MG/0.3ML injection 2-pack, Inject 0.3 mLs (0.3 mg) into the muscle as needed for anaphylaxis (Per allergy immunotherapy patient protocol.) May repeat one time in 5-15 minutes if response to initial dose is inadequate., Disp: 2 each, Rfl: 1     ipratropium-albuterol (COMBIVENT RESPIMAT)  MCG/ACT inhaler, Inhale 1 puff into the lungs 4 times daily as needed for shortness of breath, wheezing or cough, Disp: 4 g, Rfl: 1     levonorgestrel-ethinyl estrad (TORI) 90-20 MCG tablet, Take 1 tablet by mouth daily., Disp: 112 tablet, Rfl: 0     semaglutide (OZEMPIC) 2 MG/3ML pen, Prescribed by [facility] (Patient taking differently: Inject subcutaneously every 7 days. Prescribed by [facility]), Disp: , Rfl:      sertraline (ZOLOFT) 100 MG tablet, Take 1 tablet (100 mg) by mouth daily., Disp: 90 tablet, Rfl: 3     tretinoin (RETIN-A) 0.025 % external gel, Apply topically at bedtime, Disp: 20 g, Rfl: 4     ORDER FOR ALLERGEN IMMUNOTHERAPY, Name of Mix: Mix #1  Cat, Dog Cat Hair, Standardized A.P. 10,000 BAU/mL, HS  2.0 ml UF Dog Hair-Dander- 1:650 w/v HS  1.0 ml  Diluent: HSA 2 ml to 5ml, Disp: , Rfl:   No Known Allergies      EXAM:   BP 96/65 (BP Location: Left arm, Patient Position: Sitting, Cuff Size: Adult Regular)   Pulse 79   Wt 65 kg (143 lb 3.2 oz)   SpO2 99%   BMI 19.98 kg/m      Constitutional:       General: She is not in acute distress.     Appearance: Normal appearance. She is not ill-appearing.   HENT:      Head: Normocephalic and atraumatic.      Nose: Nose normal. No congestion or rhinorrhea.      Mouth/Throat:      Mouth: Mucous membranes are moist.      Pharynx: Oropharynx is clear. No posterior oropharyngeal erythema.   Eyes:      General:         Right eye: No discharge.         Left eye: No discharge.   Cardiovascular:      Rate and Rhythm: Normal rate and regular rhythm.      Heart sounds: Normal heart sounds.   Pulmonary:      Effort: Pulmonary effort is normal.      Breath sounds:  Normal breath sounds. No wheezing or rhonchi.   Skin:     General: Skin is warm.      Findings: No erythema or rash.   Neurological:      General: No focal deficit present.      Mental Status: She is alert. Mental status is at baseline.   Psychiatric:         Mood and Affect: Mood normal.        ASSESSMENT/PLAN:  Cristel Irving is a 30 year old female seen today for allergy evaluation.  She also has exercise-induced asthma.    Combivent, 1 puff 15 minute prior to exercise.   Will continue allergy shots.  Allergy shots will be discontinued summer 2026.  Zyrtec 10 mg at night    Follow-up in 1 year      Thank you for allowing me to participate in the care of Cristel Irving.      I spent 20 minutes on the date of the encounter doing chart review, history and exam, documentation and further coordination as noted above exclusive of separately reported interpretations    Cezar Crews MD  Allergy/Immunology  [facility]      Again, thank you for allowing me to participate in the care of your patient.        Sincerely,        Cezar Crews MD    Electronically signed

## 2025-08-04 ENCOUNTER — ALLIED HEALTH/NURSE VISIT (OUTPATIENT)
Dept: ALLERGY | Facility: CLINIC | Age: 30
End: 2025-08-04
Payer: COMMERCIAL

## 2025-08-04 DIAGNOSIS — J30.81 ALLERGIC RHINITIS DUE TO ANIMAL (CAT) (DOG) HAIR AND DANDER: Primary | ICD-10-CM

## 2025-08-04 PROCEDURE — 95115 IMMUNOTHERAPY ONE INJECTION: CPT

## 2025-08-18 ENCOUNTER — OFFICE VISIT (OUTPATIENT)
Dept: INTERNAL MEDICINE | Facility: CLINIC | Age: 30
End: 2025-08-18
Payer: COMMERCIAL

## 2025-08-18 VITALS
TEMPERATURE: 97.4 F | WEIGHT: 142.1 LBS | OXYGEN SATURATION: 99 % | SYSTOLIC BLOOD PRESSURE: 105 MMHG | DIASTOLIC BLOOD PRESSURE: 71 MMHG | HEIGHT: 71 IN | RESPIRATION RATE: 16 BRPM | HEART RATE: 70 BPM | BODY MASS INDEX: 19.9 KG/M2

## 2025-08-18 DIAGNOSIS — F33.42 RECURRENT MAJOR DEPRESSIVE DISORDER, IN FULL REMISSION: ICD-10-CM

## 2025-08-18 DIAGNOSIS — F41.1 GAD (GENERALIZED ANXIETY DISORDER): ICD-10-CM

## 2025-08-18 DIAGNOSIS — L70.0 ACNE VULGARIS: ICD-10-CM

## 2025-08-18 DIAGNOSIS — Z30.41 ENCOUNTER FOR BIRTH CONTROL PILLS MAINTENANCE: ICD-10-CM

## 2025-08-18 DIAGNOSIS — Z00.00 ROUTINE HISTORY AND PHYSICAL EXAMINATION OF ADULT: Primary | ICD-10-CM

## 2025-08-18 PROCEDURE — 99395 PREV VISIT EST AGE 18-39: CPT | Performed by: INTERNAL MEDICINE

## 2025-08-18 PROCEDURE — 3074F SYST BP LT 130 MM HG: CPT | Performed by: INTERNAL MEDICINE

## 2025-08-18 PROCEDURE — 99214 OFFICE O/P EST MOD 30 MIN: CPT | Mod: 25 | Performed by: INTERNAL MEDICINE

## 2025-08-18 PROCEDURE — 96127 BRIEF EMOTIONAL/BEHAV ASSMT: CPT | Performed by: INTERNAL MEDICINE

## 2025-08-18 PROCEDURE — 3078F DIAST BP <80 MM HG: CPT | Performed by: INTERNAL MEDICINE

## 2025-08-18 RX ORDER — TRETINOIN 0.25 MG/G
GEL TOPICAL AT BEDTIME
Qty: 20 G | Refills: 4 | Status: SHIPPED | OUTPATIENT
Start: 2025-08-18

## 2025-08-18 RX ORDER — SERTRALINE HYDROCHLORIDE 100 MG/1
100 TABLET, FILM COATED ORAL DAILY
Qty: 90 TABLET | Refills: 3 | Status: SHIPPED | OUTPATIENT
Start: 2025-08-18

## 2025-08-18 RX ORDER — DAPSONE 50 MG/G
GEL TOPICAL DAILY
Qty: 60 G | Refills: 3 | Status: SHIPPED | OUTPATIENT
Start: 2025-08-18

## 2025-08-18 RX ORDER — BUSPIRONE HYDROCHLORIDE 10 MG/1
10 TABLET ORAL DAILY
Qty: 90 TABLET | Refills: 3 | Status: SHIPPED | OUTPATIENT
Start: 2025-08-18

## 2025-08-18 RX ORDER — LEVONORGESTREL AND ETHINYL ESTRADIOL 90; 20 UG/1; UG/1
1 TABLET ORAL DAILY
Qty: 112 TABLET | Refills: 3 | Status: SHIPPED | OUTPATIENT
Start: 2025-08-18

## 2025-08-18 SDOH — HEALTH STABILITY: PHYSICAL HEALTH: ON AVERAGE, HOW MANY DAYS PER WEEK DO YOU ENGAGE IN MODERATE TO STRENUOUS EXERCISE (LIKE A BRISK WALK)?: 0 DAYS

## 2025-08-18 ASSESSMENT — SOCIAL DETERMINANTS OF HEALTH (SDOH): HOW OFTEN DO YOU GET TOGETHER WITH FRIENDS OR RELATIVES?: ONCE A WEEK

## 2025-08-18 ASSESSMENT — PATIENT HEALTH QUESTIONNAIRE - PHQ9
SUM OF ALL RESPONSES TO PHQ QUESTIONS 1-9: 7
10. IF YOU CHECKED OFF ANY PROBLEMS, HOW DIFFICULT HAVE THESE PROBLEMS MADE IT FOR YOU TO DO YOUR WORK, TAKE CARE OF THINGS AT HOME, OR GET ALONG WITH OTHER PEOPLE: NOT DIFFICULT AT ALL
SUM OF ALL RESPONSES TO PHQ QUESTIONS 1-9: 7

## 2025-09-03 ENCOUNTER — LAB REQUISITION (OUTPATIENT)
Dept: LAB | Facility: CLINIC | Age: 30
End: 2025-09-03

## 2025-09-03 PROCEDURE — 87635 SARS-COV-2 COVID-19 AMP PRB: CPT | Performed by: INTERNAL MEDICINE

## 2025-09-04 LAB — SARS-COV-2 RNA RESP QL NAA+PROBE: POSITIVE
